# Patient Record
Sex: MALE | Race: WHITE | NOT HISPANIC OR LATINO | URBAN - METROPOLITAN AREA
[De-identification: names, ages, dates, MRNs, and addresses within clinical notes are randomized per-mention and may not be internally consistent; named-entity substitution may affect disease eponyms.]

---

## 2019-03-17 ENCOUNTER — INPATIENT (INPATIENT)
Facility: HOSPITAL | Age: 69
LOS: 2 days | Discharge: HOME | DRG: 229 | End: 2019-03-20
Attending: STUDENT IN AN ORGANIZED HEALTH CARE EDUCATION/TRAINING PROGRAM | Admitting: INTERNAL MEDICINE
Payer: MEDICARE

## 2019-03-17 VITALS
OXYGEN SATURATION: 98 % | TEMPERATURE: 98 F | RESPIRATION RATE: 20 BRPM | HEART RATE: 78 BPM | HEIGHT: 66 IN | DIASTOLIC BLOOD PRESSURE: 90 MMHG | WEIGHT: 184.09 LBS | SYSTOLIC BLOOD PRESSURE: 161 MMHG

## 2019-03-17 DIAGNOSIS — T82.7XXA INFECTION AND INFLAMMATORY REACTION DUE TO OTHER CARDIAC AND VASCULAR DEVICES, IMPLANTS AND GRAFTS, INITIAL ENCOUNTER: ICD-10-CM

## 2019-03-17 DIAGNOSIS — I49.9 CARDIAC ARRHYTHMIA, UNSPECIFIED: ICD-10-CM

## 2019-03-17 DIAGNOSIS — E11.9 TYPE 2 DIABETES MELLITUS WITHOUT COMPLICATIONS: ICD-10-CM

## 2019-03-17 DIAGNOSIS — Z95.0 PRESENCE OF CARDIAC PACEMAKER: Chronic | ICD-10-CM

## 2019-03-17 DIAGNOSIS — I10 ESSENTIAL (PRIMARY) HYPERTENSION: ICD-10-CM

## 2019-03-17 LAB
ALBUMIN SERPL ELPH-MCNC: 3.6 G/DL — SIGNIFICANT CHANGE UP (ref 3.3–5)
ALP SERPL-CCNC: 51 U/L — SIGNIFICANT CHANGE UP (ref 40–120)
ALT FLD-CCNC: 23 U/L — SIGNIFICANT CHANGE UP (ref 10–45)
ANION GAP SERPL CALC-SCNC: 18 MMOL/L — HIGH (ref 5–17)
APTT BLD: 27.3 SEC — LOW (ref 27.5–36.3)
AST SERPL-CCNC: 19 U/L — SIGNIFICANT CHANGE UP (ref 10–40)
BASOPHILS # BLD AUTO: 0 K/UL — SIGNIFICANT CHANGE UP (ref 0–0.2)
BASOPHILS NFR BLD AUTO: 0.5 % — SIGNIFICANT CHANGE UP (ref 0–2)
BILIRUB SERPL-MCNC: 0.5 MG/DL — SIGNIFICANT CHANGE UP (ref 0.2–1.2)
BLD GP AB SCN SERPL QL: NEGATIVE — SIGNIFICANT CHANGE UP
BUN SERPL-MCNC: 18 MG/DL — SIGNIFICANT CHANGE UP (ref 7–23)
CALCIUM SERPL-MCNC: 9.9 MG/DL — SIGNIFICANT CHANGE UP (ref 8.4–10.5)
CHLORIDE SERPL-SCNC: 102 MMOL/L — SIGNIFICANT CHANGE UP (ref 96–108)
CO2 SERPL-SCNC: 20 MMOL/L — LOW (ref 22–31)
CREAT SERPL-MCNC: 1.53 MG/DL — HIGH (ref 0.5–1.3)
EOSINOPHIL # BLD AUTO: 0.2 K/UL — SIGNIFICANT CHANGE UP (ref 0–0.5)
EOSINOPHIL NFR BLD AUTO: 3.4 % — SIGNIFICANT CHANGE UP (ref 0–6)
GLUCOSE SERPL-MCNC: 274 MG/DL — HIGH (ref 70–99)
HBA1C BLD-MCNC: 8.3 % — HIGH (ref 4–5.6)
HCT VFR BLD CALC: 38.9 % — LOW (ref 39–50)
HCV AB S/CO SERPL IA: 0.15 S/CO — SIGNIFICANT CHANGE UP (ref 0–0.79)
HCV AB SERPL-IMP: SIGNIFICANT CHANGE UP
HGB BLD-MCNC: 13.1 G/DL — SIGNIFICANT CHANGE UP (ref 13–17)
INR BLD: 1.15 RATIO — SIGNIFICANT CHANGE UP (ref 0.88–1.16)
LYMPHOCYTES # BLD AUTO: 0.7 K/UL — LOW (ref 1–3.3)
LYMPHOCYTES # BLD AUTO: 11.7 % — LOW (ref 13–44)
MAGNESIUM SERPL-MCNC: 1.9 MG/DL — SIGNIFICANT CHANGE UP (ref 1.6–2.6)
MCHC RBC-ENTMCNC: 29.1 PG — SIGNIFICANT CHANGE UP (ref 27–34)
MCHC RBC-ENTMCNC: 33.7 GM/DL — SIGNIFICANT CHANGE UP (ref 32–36)
MCV RBC AUTO: 86.2 FL — SIGNIFICANT CHANGE UP (ref 80–100)
MONOCYTES # BLD AUTO: 0.9 K/UL — SIGNIFICANT CHANGE UP (ref 0–0.9)
MONOCYTES NFR BLD AUTO: 15.8 % — HIGH (ref 2–14)
NEUTROPHILS # BLD AUTO: 4 K/UL — SIGNIFICANT CHANGE UP (ref 1.8–7.4)
NEUTROPHILS NFR BLD AUTO: 68.6 % — SIGNIFICANT CHANGE UP (ref 43–77)
PHOSPHATE SERPL-MCNC: 3.3 MG/DL — SIGNIFICANT CHANGE UP (ref 2.5–4.5)
PLATELET # BLD AUTO: 169 K/UL — SIGNIFICANT CHANGE UP (ref 150–400)
POTASSIUM SERPL-MCNC: 4.7 MMOL/L — SIGNIFICANT CHANGE UP (ref 3.5–5.3)
POTASSIUM SERPL-SCNC: 4.7 MMOL/L — SIGNIFICANT CHANGE UP (ref 3.5–5.3)
PROT SERPL-MCNC: 7.6 G/DL — SIGNIFICANT CHANGE UP (ref 6–8.3)
PROTHROM AB SERPL-ACNC: 13.2 SEC — HIGH (ref 10–12.9)
RBC # BLD: 4.52 M/UL — SIGNIFICANT CHANGE UP (ref 4.2–5.8)
RBC # FLD: 14.6 % — HIGH (ref 10.3–14.5)
RH IG SCN BLD-IMP: NEGATIVE — SIGNIFICANT CHANGE UP
SODIUM SERPL-SCNC: 140 MMOL/L — SIGNIFICANT CHANGE UP (ref 135–145)
WBC # BLD: 5.8 K/UL — SIGNIFICANT CHANGE UP (ref 3.8–10.5)
WBC # FLD AUTO: 5.8 K/UL — SIGNIFICANT CHANGE UP (ref 3.8–10.5)

## 2019-03-17 PROCEDURE — 93010 ELECTROCARDIOGRAM REPORT: CPT

## 2019-03-17 PROCEDURE — 99223 1ST HOSP IP/OBS HIGH 75: CPT | Mod: GC

## 2019-03-17 PROCEDURE — 71045 X-RAY EXAM CHEST 1 VIEW: CPT | Mod: 26

## 2019-03-17 RX ORDER — PIOGLITAZONE HYDROCHLORIDE 15 MG/1
1 TABLET ORAL
Qty: 0 | Refills: 0 | COMMUNITY

## 2019-03-17 RX ORDER — EZETIMIBE AND SIMVASTATIN 10; 80 MG/1; MG/1
1 TABLET, FILM COATED ORAL
Qty: 0 | Refills: 0 | COMMUNITY

## 2019-03-17 RX ORDER — CLOPIDOGREL BISULFATE 75 MG/1
75 TABLET, FILM COATED ORAL DAILY
Qty: 0 | Refills: 0 | Status: DISCONTINUED | OUTPATIENT
Start: 2019-03-17 | End: 2019-03-20

## 2019-03-17 RX ORDER — ASPIRIN/CALCIUM CARB/MAGNESIUM 324 MG
81 TABLET ORAL DAILY
Qty: 0 | Refills: 0 | Status: DISCONTINUED | OUTPATIENT
Start: 2019-03-17 | End: 2019-03-20

## 2019-03-17 RX ORDER — OMEGA-3 ACID ETHYL ESTERS 1 G
1 CAPSULE ORAL
Qty: 0 | Refills: 0 | COMMUNITY

## 2019-03-17 RX ORDER — SIMVASTATIN 20 MG/1
10 TABLET, FILM COATED ORAL AT BEDTIME
Qty: 0 | Refills: 0 | Status: DISCONTINUED | OUTPATIENT
Start: 2019-03-17 | End: 2019-03-20

## 2019-03-17 RX ORDER — HYDRALAZINE HCL 50 MG
10 TABLET ORAL EVERY 8 HOURS
Qty: 0 | Refills: 0 | Status: DISCONTINUED | OUTPATIENT
Start: 2019-03-17 | End: 2019-03-20

## 2019-03-17 RX ORDER — CLOPIDOGREL BISULFATE 75 MG/1
1 TABLET, FILM COATED ORAL
Qty: 0 | Refills: 0 | COMMUNITY

## 2019-03-17 RX ORDER — SITAGLIPTIN AND METFORMIN HYDROCHLORIDE 500; 50 MG/1; MG/1
1 TABLET, FILM COATED ORAL
Qty: 0 | Refills: 0 | COMMUNITY

## 2019-03-17 RX ORDER — ASPIRIN/CALCIUM CARB/MAGNESIUM 324 MG
1 TABLET ORAL
Qty: 0 | Refills: 0 | COMMUNITY

## 2019-03-17 RX ORDER — DAPTOMYCIN 500 MG/10ML
500 INJECTION, POWDER, LYOPHILIZED, FOR SOLUTION INTRAVENOUS EVERY 24 HOURS
Qty: 0 | Refills: 0 | Status: DISCONTINUED | OUTPATIENT
Start: 2019-03-18 | End: 2019-03-20

## 2019-03-17 RX ORDER — INSULIN LISPRO 100/ML
VIAL (ML) SUBCUTANEOUS
Qty: 0 | Refills: 0 | Status: DISCONTINUED | OUTPATIENT
Start: 2019-03-17 | End: 2019-03-20

## 2019-03-17 RX ORDER — OMEPRAZOLE 10 MG/1
1 CAPSULE, DELAYED RELEASE ORAL
Qty: 0 | Refills: 0 | COMMUNITY

## 2019-03-17 RX ORDER — INSULIN GLARGINE 100 [IU]/ML
5 INJECTION, SOLUTION SUBCUTANEOUS AT BEDTIME
Qty: 0 | Refills: 0 | Status: DISCONTINUED | OUTPATIENT
Start: 2019-03-17 | End: 2019-03-20

## 2019-03-17 RX ORDER — AMLODIPINE BESYLATE 2.5 MG/1
1 TABLET ORAL
Qty: 0 | Refills: 0 | COMMUNITY

## 2019-03-17 RX ORDER — PREGABALIN 225 MG/1
1 CAPSULE ORAL
Qty: 0 | Refills: 0 | COMMUNITY

## 2019-03-17 RX ORDER — INSULIN LISPRO 100/ML
VIAL (ML) SUBCUTANEOUS AT BEDTIME
Qty: 0 | Refills: 0 | Status: DISCONTINUED | OUTPATIENT
Start: 2019-03-17 | End: 2019-03-20

## 2019-03-17 RX ORDER — EMPAGLIFLOZIN 10 MG/1
1 TABLET, FILM COATED ORAL
Qty: 0 | Refills: 0 | COMMUNITY

## 2019-03-17 RX ADMIN — SIMVASTATIN 10 MILLIGRAM(S): 20 TABLET, FILM COATED ORAL at 22:54

## 2019-03-17 RX ADMIN — Medication 4: at 16:28

## 2019-03-17 RX ADMIN — INSULIN GLARGINE 5 UNIT(S): 100 INJECTION, SOLUTION SUBCUTANEOUS at 21:18

## 2019-03-17 RX ADMIN — Medication 10 MILLIGRAM(S): at 22:54

## 2019-03-17 RX ADMIN — Medication 10 MILLIGRAM(S): at 16:05

## 2019-03-17 NOTE — CHART NOTE - NSCHARTNOTEFT_GEN_A_CORE
====================  CCU MIDNIGHT ROUNDS  ====================    GUSTAVO LOVELL  [id number]    ====================  SUMMARY: HPI:  This is a 68 year old man with past medical history of Sick Sinus Syndrome with PPM (Medtronic), T2DM, HTN, HLD, PVD with Bilateral LE Stents and GERD transferred from [facility] for lead extraction 2/2 infected Pacemaker.  Patient states he had a battery change last year and his PPM migrated out through incision site.  Chart discussed erosion of generator and Coag-negative Staph infection of PPM pocket site and negative blood cultures, received Vanc and Zosyn from 3/7-3/12 and started on Daptomycin 500mg IV daily since 3/13.  Pacemaker extracted and leads attached to PPM near RIJ dressed sterilely.  During course of hospitalization patient developed FRANCIS slowly resolving, creatinine is 1.53 at [facility] (high 2.2 at [facility]).  Patient denies fever, chills, syncope, recent sick contact, SOB, chest pain, abdominal pain, N/V/D. (17 Mar 2019 15:50)    ====================        ====================  NEW EVENTS:  ====================        ====================  VITALS (Last 12 hrs):  ====================    T(C): 37.3 (03-17-19 @ 19:00), Max: 37.3 (03-17-19 @ 19:00)  HR: 80 (03-17-19 @ 20:00) (78 - 80)  BP: 113/87 (03-17-19 @ 20:00) (113/87 - 183/76)  BP(mean): 97 (03-17-19 @ 20:00) (84 - 116)  RR: 21 (03-17-19 @ 20:00) (19 - 24)  SpO2: 98% (03-17-19 @ 20:00) (96% - 99%)      TELEMETRY:      *BLOOD GAS/ARTERIAL/MIXED/VENOUS  *LACTATE    I&O's Summary    17 Mar 2019 07:01  -  17 Mar 2019 20:36  --------------------------------------------------------  IN: 240 mL / OUT: 0 mL / NET: 240 mL        ====================  PLAN:  ====================      Jose Liao NIK MARKS  #[id number] ====================  CCU MIDNIGHT ROUNDS  ====================    GUSTAVO LOVELL  [id number]    ====================  SUMMARY: HPI:  This is a 68 year old man with past medical history of Sick Sinus Syndrome with PPM (Medtronic), T2DM, HTN, HLD, PVD with Bilateral LE Stents and GERD transferred from [facility] for lead extraction 2/2 infected Pacemaker.  Patient states he had a battery change last year and his PPM migrated out through incision site.  Chart discussed erosion of generator and Coag-negative Staph infection of PPM pocket site and negative blood cultures, received Vanc and Zosyn from 3/7-3/12 and started on Daptomycin 500mg IV daily since 3/13.  Pacemaker extracted and leads attached to PPM near RIJ dressed sterilely.  During course of hospitalization patient developed FRANCIS slowly resolving, creatinine is 1.53 at [facility] (high 2.2 at [facility]).  Patient denies fever, chills, syncope, recent sick contact, SOB, chest pain, abdominal pain, N/V/D. (17 Mar 2019 15:50)    ====================        ====================  NEW EVENTS:  ====================  Pt c/o chills, discomfort. Afebrile, VSS. Will f/u CBC and monitor for fevers.      ====================  VITALS (Last 12 hrs):  ====================    T(C): 37.3 (03-17-19 @ 19:00), Max: 37.3 (03-17-19 @ 19:00)  HR: 80 (03-17-19 @ 20:00) (78 - 80)  BP: 113/87 (03-17-19 @ 20:00) (113/87 - 183/76)  BP(mean): 97 (03-17-19 @ 20:00) (84 - 116)  RR: 21 (03-17-19 @ 20:00) (19 - 24)  SpO2: 98% (03-17-19 @ 20:00) (96% - 99%)      TELEMETRY: Paced rhythm      I&O's Summary    17 Mar 2019 07:01  -  17 Mar 2019 20:36  --------------------------------------------------------  IN: 240 mL / OUT: 0 mL / NET: 240 mL        ====================  PLAN:  ====================    #Infected PPM  - NPO after midnight for possible lead extraction  - Cont daptomycin for PPM infection, check CPK weekly while on dapto. F/u BCx. ID recs appreciated.  - FRANCIS from vanc at OSH, f/u BMP, avoid nephrotoxic agents  - TTE tomorrow  - Cont DAPT, statin for b/l LE stents  - Cont hydralazine    Jose Liao, CCU PA-C  #[id number]

## 2019-03-17 NOTE — CONSULT NOTE ADULT - ASSESSMENT
68 year old man with past medical history of Sick Sinus Syndrome with PPM (Medtronic), T2DM, HTN, HLD, PVD with Bilateral LE Stents and GERD transferred from [facility] for lead extraction 2/2 infected Pacemaker.  Patient states he had a battery change last year and his PPM migrated out through incision site. Records from [place] reviewed.   Only cx positive are superficial wound cx : Coag-negative Staph   Blood cx : negative blood cultures    Antibiotic history:  Vancomycin: 3/7 through 3/12  Zosyn : 3/7 through 3/12 then   Received Daptomycin 500mg IV daily since 3/13.  (Vancomcyin switched to Daptomycin for elevated creatinine)    So far has received 14 days of therapy for CNS in superficial cx.     Suggest:    *Infected pacemaker:    c/w Daptomycin 500mg IV daily     * r/o endocarditis     all blood cx from [place] are negative     check TTE     Repeat blood cx 68 year old man with past medical history of Sick Sinus Syndrome with PPM (Medtronic), T2DM, HTN, HLD, PVD with Bilateral LE Stents and GERD transferred from [facility] for lead extraction 2/2 infected Pacemaker.  Patient states he had a battery change last year and his PPM migrated out through incision site. Records from [place] reviewed.   Only cx positive are superficial wound cx : Coag-negative Staph   Blood cx : negative blood cultures    Antibiotic history:  Vancomycin: 3/7 through 3/12  Zosyn : 3/7 through 3/12 then   Received Daptomycin 500mg IV daily since 3/13.  (Vancomcyin switched to Daptomycin for elevated creatinine)  So far has received 14 days of therapy for CNS in superficial cx.     Pacemaker infection   r/o endocarditis     Suggest:    *Infected pacemaker:    c/w Daptomycin 500mg IV daily     * r/o endocarditis     all blood cx from [place] are negative     check TTE     Repeat blood cx

## 2019-03-17 NOTE — H&P ADULT - NSHPREVIEWOFSYSTEMS_GEN_ALL_CORE
REVIEW OF SYSTEMS:    CONSTITUTIONAL: No weakness, fevers or chills  EYES/ENT: No visual changes;  No vertigo or throat pain   NECK: No pain or stiffness  RESPIRATORY: No cough, wheezing, hemoptysis; No shortness of breath  CARDIOVASCULAR: No chest pain or palpitations, dehisence of ppm incision with migration of ppm  GASTROINTESTINAL: No abdominal or epigastric pain. No nausea, vomiting, or hematemesis; No diarrhea or constipation. No melena or hematochezia.  GENITOURINARY: No dysuria, frequency or hematuria  NEUROLOGICAL: No numbness or weakness  SKIN: No itching, burning, rashes, or lesions   All other review of systems is negative unless indicated above.

## 2019-03-17 NOTE — H&P ADULT - NSICDXPROBLEM_GEN_ALL_CORE_FT
PROBLEM DIAGNOSES  Problem: Hypertension  Assessment and Plan: Hydralazine 10mg TID    Problem: Type 2 diabetes mellitus  Assessment and Plan: Lantus 5units for expected NPO   HISS  Consistent Carb DASH Diet    Problem: Infected pacemaker  Assessment and Plan: ECG, Chest Xray TTE to r/o Endocarditis  ID consult re Daptomycin continuation  NPO p MN for possible Lead extraction

## 2019-03-17 NOTE — H&P ADULT - NSICDXPASTMEDICALHX_GEN_ALL_CORE_FT
PAST MEDICAL HISTORY:  Chronic GERD     History of sick sinus syndrome     Hyperlipidemia     Hypertension     Peripheral vascular disease     Type 2 diabetes mellitus

## 2019-03-17 NOTE — CONSULT NOTE ADULT - SUBJECTIVE AND OBJECTIVE BOX
Reason for Consultation: Lead Extraction  Chief Complaint: Transfer from [facility] for Lead Extraction  Date of Admission: 3/17/19    HPI:  68 year old male with PMHx HTN, HLD     Underwent attmpted system extraction with Dr. Dhaliwal. Right IJ active fixation temporary pacemaker with externalized Medtronic generator was placed. At the time of attempted extraction, the generated was removed, but the remaining pocket leads were cute and retracted to the central circulation Currently, he remains with 3 intravascular pacing leads. Transferred to [facility] for complete lead extraction of the remaining leads before reimplanation     Past Medical History:       Past Surgical History:       Medications:       Allergies:  Allergy Status Unknown      FAMILY HISTORY:      Social History:  Tobacco Use: denies  Alcohol Use: denies  Drug Use: denies    Review of Systems:  REVIEW OF SYSTEMS:  CONSTITUTIONAL: No weakness, fevers or chills  EYES/ENT: No visual changes;  No dysphagia  NECK: No pain or stiffness  RESPIRATORY: No cough, wheezing, hemoptysis; No shortness of breath  CARDIOVASCULAR: No chest pain or palpitations; No lower extremity edema  GASTROINTESTINAL: No abdominal or epigastric pain. No nausea, vomiting, or hematemesis; No diarrhea or constipation. No melena or hematochezia.  BACK: No back pain  GENITOURINARY: No dysuria, frequency or hematuria  NEUROLOGICAL: No numbness or weakness  SKIN: No itching, burning, rashes, or lesions   All other review of systems is negative unless indicated above.    Vitals:  T(F): 98 (03-17), Max: 98 (03-17)  HR: 78 (03-17) (78 - 78)  BP: 153/73 (03-17) (150/74 - 176/73)  RR: 24 (03-17)  SpO2: 97% (03-17)    Physical Exam:  Appearance: no acute distress  HEENT: PERRL  Neck: no JVD  Cardiovascular: RRR, normal S1 S2, no murmurs, rubs, or gallops  Respiratory: clear to auscultation bilaterally  Gastrointestinal: soft, non-tender, non-distended  Neurologic: non-focal  Psychiatry: AAOx3, mood & affect appropriate  Extremities: no LE edema  Vascular: peripheral pulses palpable 2+ bilaterally  Skin: no rashes, ecchymoses, or cyanosis  Lymphatic: no lymphadenopathy    Labs: Personally reviewed                          CARDIOVASCULAR DIAGNOSTIC TESTING:  Telemetry:  ECG: Personally Reviewed    [] Echocardiogram:  [] Stress Test:  [] Catheterization:    RADIOLOGY: Reason for Consultation: Lead Extraction  Chief Complaint: Transfer from [facility] for Lead Extraction  Date of Admission: 3/17/19    HPI:  68M with Sick Sinus Syndrome with PPM (Medtronic), HTN, HLD, DM2, PVD with Bilateral LE Stents and GERD transferred from [facility] for lead extraction 2/2 infected pacemaker.  Patient states he had a battery change last year and his PPM migrated out through incision site. Underwent attempted system extraction with Dr. Dhaliwal. Right IJ active fixation temporary pacemaker with externalized Medtronic generator was placed. At the time of attempted extraction, the generated was removed, but the remaining pocket leads were cute and retracted to the central circulation Currently, he remains with 3 intravascular pacing leads. Transferred to [facility] for complete lead extraction of the remaining leads before reimplantation.     Per review of chart documentation, patient has coag-negative Staph infection of PPM pocket site on wound culture and negative blood cultures. Received Vanc and Zosyn from 3/7-3/12 and started on Daptomycin 500mg IV daily since 3/13. During course of hospitalization patient developed FRANCIS slowly resolving, creatinine is 1.53 at [facility] (high 2.2 at [facility]).  Patient denies fever, chills, syncope, recent sick contact, SOB, chest pain, abdominal pain, N/V/D.           Past Medical History:       Past Surgical History:       Medications:       Allergies:  Allergy Status Unknown      FAMILY HISTORY:      Social History:  Tobacco Use: denies  Alcohol Use: denies  Drug Use: denies    Review of Systems:  REVIEW OF SYSTEMS:  CONSTITUTIONAL: No weakness, fevers or chills  EYES/ENT: No visual changes;  No dysphagia  NECK: No pain or stiffness  RESPIRATORY: No cough, wheezing, hemoptysis; No shortness of breath  CARDIOVASCULAR: No chest pain or palpitations; No lower extremity edema  GASTROINTESTINAL: No abdominal or epigastric pain. No nausea, vomiting, or hematemesis; No diarrhea or constipation. No melena or hematochezia.  BACK: No back pain  GENITOURINARY: No dysuria, frequency or hematuria  NEUROLOGICAL: No numbness or weakness  SKIN: No itching, burning, rashes, or lesions   All other review of systems is negative unless indicated above.    Vitals:  T(F): 98 (03-17), Max: 98 (03-17)  HR: 78 (03-17) (78 - 78)  BP: 153/73 (03-17) (150/74 - 176/73)  RR: 24 (03-17)  SpO2: 97% (03-17)    Physical Exam:  Appearance: no acute distress  HEENT: PERRL  Neck: no JVD  Cardiovascular: RRR, normal S1 S2, no murmurs, rubs, or gallops  Respiratory: clear to auscultation bilaterally  Gastrointestinal: soft, non-tender, non-distended  Neurologic: non-focal  Psychiatry: AAOx3, mood & affect appropriate  Extremities: no LE edema  Vascular: peripheral pulses palpable 2+ bilaterally  Skin: no rashes, ecchymoses, or cyanosis  Lymphatic: no lymphadenopathy    Labs: Personally reviewed                          CARDIOVASCULAR DIAGNOSTIC TESTING:  Telemetry:  ECG: Personally Reviewed    [] Echocardiogram:  [] Stress Test:  [] Catheterization:    RADIOLOGY: Reason for Consultation: Lead Extraction  Chief Complaint: Transfer from [facility] for Lead Extraction  Date of Admission: 3/17/19    HPI:  68M with Sick Sinus Syndrome with PPM (Medtronic), HTN, HLD, DM2, PVD with Bilateral LE Stents and GERD transferred from [facility] for lead extraction 2/2 infected pacemaker.  Patient states he had a battery change last year and his PPM migrated out through incision site. Underwent attempted system extraction with Dr. Dhaliwal. Right IJ active fixation temporary pacemaker with externalized Medtronic generator was placed. At the time of attempted extraction, the generator was removed, but the remaining pocket leads were cut and retracted to the central circulation Currently, he remains with 3 intravascular pacing leads. Transferred to [facility] for complete lead extraction of the remaining leads before reimplantation. Per review of chart documentation, patient has coag-negative Staph infection of PPM pocket site on wound culture and negative blood cultures. Received Vanc and Zosyn from 3/7-3/12 and started on Daptomycin 500mg IV daily since 3/13. During course of hospitalization patient developed FRANCIS slowly resolving, creatinine is 1.53 at [facility] (high 2.2 at [facility]).  Patient denies fever, chills, syncope, recent sick contact, SOB, chest pain, abdominal pain, N/V/D.     Past Medical and Surgical History:   Peripheral vascular disease  Hyperlipidemia  Hypertension  Chronic GERD  History of sick sinus syndrome  Type 2 diabetes mellitus  Presence of permanent cardiac pacemaker    Medications:     Allergies:  Allergy Status Unknown    FAMILY HISTORY:  No pertinent hx in 1st degree relatives    Social History:  Tobacco Use: denies  Alcohol Use: denies  Drug Use: denies    Review of Systems:  CONSTITUTIONAL: No weakness, fevers or chills  EYES/ENT: No visual changes;  No dysphagia  NECK: No pain or stiffness  RESPIRATORY: No cough, wheezing, hemoptysis; No shortness of breath  CARDIOVASCULAR: No chest pain or palpitations; No lower extremity edema  GASTROINTESTINAL: No abdominal or epigastric pain. No nausea, vomiting, or hematemesis; No diarrhea or constipation. No melena or hematochezia.  BACK: No back pain  GENITOURINARY: No dysuria, frequency or hematuria  NEUROLOGICAL: No numbness or weakness  SKIN: No itching, burning, rashes, or lesions   All other review of systems is negative unless indicated above.    Vitals:  T(F): 98 (03-17), Max: 98 (03-17)  HR: 78 (03-17) (78 - 78)  BP: 153/73 (03-17) (150/74 - 176/73)  RR: 24 (03-17)  SpO2: 97% (03-17)    Physical Exam:  Appearance: no acute distress  HEENT: PERRL  Neck: no JVD, right neck dressing c/d/i  Cardiovascular: RRR, normal S1 S2, no murmurs, rubs, or gallops  Respiratory: clear to auscultation bilaterally  Gastrointestinal: soft, non-tender, non-distended  Neurologic: non-focal  Psychiatry: AAOx3, mood & affect appropriate  Extremities: no LE edema  Vascular: peripheral pulses palpable 2+ bilaterally  Skin: left chest wall wound, dressing c/d/i    Labs: Personally reviewed    CARDIOVASCULAR DIAGNOSTIC TESTING:  Telemetry:  ECG: Personally Reviewed    [] Echocardiogram:  [] Stress Test:  [] Catheterization:    RADIOLOGY: Reason for Consultation: Lead Extraction  Chief Complaint: Transfer from [facility] for Lead Extraction  Date of Admission: 3/17/19    HPI:  68M with CHB vs. SSS s/p PPM (Medtronic), HTN, HLD, DM2, PVD with Bilateral LE Stents and GERD transferred from [facility] for lead extraction 2/2 infected pacemaker.  Patient states he had a battery change last year and his PPM migrated out through incision site. Underwent attempted system extraction with Dr. Dhaliwal. Right IJ active fixation temporary pacemaker with externalized Medtronic generator was placed. At the time of attempted extraction, the generator was removed, but the remaining pocket leads were cut and retracted to the central circulation Currently, he remains with 3 intravascular pacing leads. Transferred to [facility] for complete lead extraction of the remaining leads before reimplantation. Per review of chart documentation, patient has coag-negative Staph infection of PPM pocket site on wound culture and negative blood cultures. Received Vanc and Zosyn from 3/7-3/12 and started on Daptomycin 500mg IV daily since 3/13. During course of hospitalization patient developed FRANCIS slowly resolving, creatinine is 1.53 at [facility] (high 2.2 at [facility]).  Patient denies fever, chills, syncope, recent sick contact, SOB, chest pain, abdominal pain, N/V/D.     Past Medical and Surgical History:   Peripheral vascular disease  Hyperlipidemia  Hypertension  Chronic GERD  History of sick sinus syndrome  Type 2 diabetes mellitus  Presence of permanent cardiac pacemaker    Medications:     Allergies:  Allergy Status Unknown    FAMILY HISTORY:  No pertinent hx in 1st degree relatives    Social History:  Tobacco Use: denies  Alcohol Use: denies  Drug Use: denies    Review of Systems:  CONSTITUTIONAL: No weakness, fevers or chills  EYES/ENT: No visual changes;  No dysphagia  NECK: No pain or stiffness  RESPIRATORY: No cough, wheezing, hemoptysis; No shortness of breath  CARDIOVASCULAR: No chest pain or palpitations; No lower extremity edema  GASTROINTESTINAL: No abdominal or epigastric pain. No nausea, vomiting, or hematemesis; No diarrhea or constipation. No melena or hematochezia.  BACK: No back pain  GENITOURINARY: No dysuria, frequency or hematuria  NEUROLOGICAL: No numbness or weakness  SKIN: No itching, burning, rashes, or lesions   All other review of systems is negative unless indicated above.    Vitals:  T(F): 98 (03-17), Max: 98 (03-17)  HR: 78 (03-17) (78 - 78)  BP: 153/73 (03-17) (150/74 - 176/73)  RR: 24 (03-17)  SpO2: 97% (03-17)    Physical Exam:  Appearance: no acute distress  HEENT: PERRL  Neck: no JVD, right neck dressing c/d/i  Cardiovascular: RRR, normal S1 S2, no murmurs, rubs, or gallops  Respiratory: clear to auscultation bilaterally  Gastrointestinal: soft, non-tender, non-distended  Neurologic: non-focal  Psychiatry: AAOx3, mood & affect appropriate  Extremities: no LE edema  Vascular: peripheral pulses palpable 2+ bilaterally  Skin: left chest wall wound, dressing c/d/i    Labs: Personally reviewed    CARDIOVASCULAR DIAGNOSTIC TESTING:  Telemetry:  ECG: Personally Reviewed    [] Echocardiogram:  [] Stress Test:  [] Catheterization:    RADIOLOGY:

## 2019-03-17 NOTE — CONSULT NOTE ADULT - ASSESSMENT
68M with Sick Sinus Syndrome with PPM (Medtronic), HTN, HLD, DM2, PVD with Bilateral LE Stents and GERD transferred from [facility] for lead extraction 2/2 infected pacemaker.    Recommendations:  -obtain CXR  -obtain Echo to assess EF and rule out endocarditis  -hold DVT Px in PM  -NPO for lead extraction in AM    Shaq Loomis M.D.  Cardiology Fellow 68M with Sick Sinus Syndrome with PPM (Medtronic), HTN, HLD, DM2, PVD with Bilateral LE Stents and GERD transferred from [facility] for lead extraction 2/2 infected pacemaker. Underwent attempted system extraction with Dr. Dhaliwal. Right IJ active fixation temporary pacemaker with externalized Medtronic generator was placed. At the time of attempted extraction, the generator was removed, but the remaining pocket leads were cut and retracted to the central circulation. Remains with 3 intravascular pacing leads. Currently, VVI paced at HR 80.    Recommendations:  -obtain CXR  -obtain Echo to assess EF and rule out endocarditis  -hold DVT Px in PM  -NPO for lead extraction in AM    Shaq Loomis M.D.  Cardiology Fellow 68M with CHB vs. SSS s/p PPM (Medtronic), HTN, HLD, DM2, PVD with Bilateral LE Stents and GERD transferred from [facility] for lead extraction 2/2 infected pacemaker. Underwent attempted system extraction with Dr. Dhaliwal. Right IJ active fixation temporary pacemaker with externalized Medtronic generator was placed. At the time of attempted extraction, the generator was removed, but the remaining pocket leads were cut and retracted to the central circulation. Remains with 3 intravascular pacing leads. Currently, VVI paced at HR 80.    Recommendations:  -obtain CXR  -obtain Echo to assess EF and rule out endocarditis  -hold DVT Px in PM  -NPO for lead extraction in AM    Shaq Loomis M.D.  Cardiology Fellow

## 2019-03-17 NOTE — H&P ADULT - NSHPPHYSICALEXAM_GEN_ALL_CORE
PHYSICAL EXAM:      Constitutional: No acute disress    Eyes: DWIGHT, EOMI    ENMT: Nml oral mucosa    Respiratory: CTA bilaterally    Cardiovascular: S1S2 RRR PPM Extraction with leads attched externally to temp PPM    Gastrointestinal: +bs    Extremities: No pedal edema    Vascular: +2 pedal pulses    Neurological: A+OX3

## 2019-03-17 NOTE — CONSULT NOTE ADULT - SUBJECTIVE AND OBJECTIVE BOX
Patient is a 68y old  Male who presents with a chief complaint of Lead Extraction 2/2 Infected PPM (17 Mar 2019 15:50)    HPI:  This is a 68 year old man with past medical history of Sick Sinus Syndrome with PPM (Medtronic), T2DM, HTN, HLD, PVD with Bilateral LE Stents and GERD transferred from [facility] for lead extraction 2/2 infected Pacemaker.  Patient states he had a battery change last year and his PPM migrated out through incision site.  Chart discussed erosion of generator and Coag-negative Staph infection of PPM pocket site and negative blood cultures, received Vanc and Zosyn from 3/7-3/12 and started on Daptomycin 500mg IV daily since 3/13.  Pacemaker extracted and leads attached to PPM near RIJ dressed sterilely.  During course of hospitalization patient developed FRANCIS slowly resolving, creatinine is 1.53 at [facility] (high 2.2 at [facility]).  Patient denies fever, chills, syncope, recent sick contact, SOB, chest pain, abdominal pain, N/V/D. (17 Mar 2019 15:50)    Above reviewed. Patient denied pain at pacemaker site, fever, chills. ROS negative.     prior hospital charts reviewed [ x ]  primary team notes reviewed [ x ]  other consultant notes reviewed [ x ]    PAST MEDICAL & SURGICAL HISTORY:  Peripheral vascular disease  Hyperlipidemia  Hypertension  Chronic GERD  History of sick sinus syndrome  Type 2 diabetes mellitus  Presence of permanent cardiac pacemaker    Allergies  contrast media (iodine-based) (Short breath)    ANTIMICROBIALS (past 90 days)  MEDICATIONS  (STANDING):    ANTIMICROBIALS:      OTHER MEDS: MEDICATIONS  (STANDING):  aspirin enteric coated 81 daily  clopidogrel Tablet 75 daily  hydrALAZINE 10 every 8 hours  insulin glargine Injectable (LANTUS) 5 at bedtime  insulin lispro (HumaLOG) corrective regimen sliding scale  three times a day before meals  insulin lispro (HumaLOG) corrective regimen sliding scale  at bedtime  simvastatin 10 at bedtime    SOCIAL HISTORY:   hx smoking  non-smoker    FAMILY HISTORY:  Mother: , age 54 stroke   Father,  of old age     REVIEW OF SYSTEMS  [  ] ROS unobtainable because:    [ x ] All other systems negative except as noted below: pacemaker site wound dehiscence     Constitutional:  [ ] fever [ ] chills  [ ] weight loss  [ ] weakness  Skin:  [ ] rash [ ] phlebitis	  Eyes: [ ] icterus [ ] pain  [ ] discharge	  ENMT: [ ] sore throat  [ ] thrush [ ] ulcers [ ] exudates  Respiratory: [ ] dyspnea [ ] hemoptysis [ ] cough [ ] sputum	  Cardiovascular:  [ ] chest pain [ ] palpitations [ ] edema	  Gastrointestinal:  [ ] nausea [ ] vomiting [ ] diarrhea [ ] constipation [ ] pain	  Genitourinary:  [ ] dysuria [ ] frequency [ ] hematuria [ ] discharge [ ] flank pain  [ ] incontinence  Musculoskeletal:  [ ] myalgias [ ] arthralgias [ ] arthritis  [ ] back pain  Neurological:  [ ] headache [ ] seizures  [ ] confusion/altered mental status  Psychiatric:  [ ] anxiety [ ] depression	  Hematology/Lymphatics:  [ ] lymphadenopathy  Endocrine:  [ ] adrenal [ ] thyroid  Allergic/Immunologic:	 [ ] transplant [ ] seasonal    Vital Signs Last 24 Hrs  T(F): 98 (19 @ 14:13), Max: 98 (19 @ 14:13)    Vital Signs Last 24 Hrs  HR: 78 (19 @ 16:00) (78 - 78)  BP: 171/71 (19 @ 16:00) (150/74 - 176/73)  RR: 22 (19 @ 16:00)  SpO2: 98% (19 @ 16:00) (97% - 98%)  Wt(kg): --    PHYSICAL EXAM:  General: non-toxic  HEAD/EYES: anicteric, PERRL  ENT:  supple  Cardiovascular:   S1, S2  chest wall : Pacemaker site dressing C/D/I   Respiratory:  clear bilaterally  GI:  soft, non-tender, normal bowel sounds  :  no CVA tenderness   Musculoskeletal:  no synovitis  Neurologic:  grossly non-focal  Skin:  no rash  Lymph: no lymphadenopathy  Psychiatric:  appropriate affect  Vascular:  no phlebitis                        13.1   5.8   )-----------( 169      ( 17 Mar 2019 14:56 )             38.9         140  |  102  |  18  ----------------------------<  274<H>  4.7   |  20<L>  |  1.53<H>    Ca    9.9      17 Mar 2019 14:56  Phos  3.3       Mg     1.9         TPro  7.6  /  Alb  3.6  /  TBili  0.5  /  DBili  x   /  AST  19  /  ALT  23  /  AlkPhos  51  03-17      MICROBIOLOGY:  From [facility]     Superficial wound cx - 3/6/2019    Blood cx: negative     RADIOLOGY:  NA

## 2019-03-17 NOTE — CONSULT NOTE ADULT - ATTENDING COMMENTS
68 year old male with Sick Sinus Syndrome requiring a pacemaker, had a pacemaker battery exchage about a year ago and noticed that the PPM was migrating through the skin. He was transferred from [facility] where the PPM was extracted, but the lead wires remained and attached to PPM near [facility]. During his hospitalization, he developed worsening renal function, and was changed from vancomycin to daptomycin. All blood cultures were negative, but a wound culture was positive for CoNS. He was continued on dapto and transferred to [facility] for lead extraction and reimplantation of another PPM. He currently feels well. Afebrile. WBC WNL. Recommend: Continue daptomycin 500 mG IV q 24 hours, check CPK weekly while on daptomycin, check blood cultures x 2 sets, f/u EP regarding explantation of lead wires. Check TTE.

## 2019-03-17 NOTE — H&P ADULT - HISTORY OF PRESENT ILLNESS
This is a 68 year old man with past medical history of Sick Sinus Syndrome with PPM (Medtronic), T2DM, HTN, HLD, PVD with Bilateral LE Stents and GERD transferred from [facility] for lead extraction 2/2 infected Pacemaker.  Patient states he had a battery change last year and his PPM migrated out through incision site.  Chart discussed erosion of generator and Coag-negative Staph infection of PPM pocket site and negative blood cultures, received Vanc and Zosyn from 3/7-3/12 and started on Daptomycin 500mg IV daily since 3/13.  Pacemaker extracted and leads attached to PPM near RIJ dressed sterilely.  During course of hospitalization patient developed FRANCIS slowly resolving, creatinine is 1.53 at [facility] (high 2.2 at [facility]).  Patient denies fever, chills, syncope, recent sick contact, SOB, chest pain, abdominal pain, N/V/D.

## 2019-03-18 DIAGNOSIS — I73.9 PERIPHERAL VASCULAR DISEASE, UNSPECIFIED: ICD-10-CM

## 2019-03-18 DIAGNOSIS — N17.9 ACUTE KIDNEY FAILURE, UNSPECIFIED: ICD-10-CM

## 2019-03-18 LAB
ANION GAP SERPL CALC-SCNC: 13 MMOL/L — SIGNIFICANT CHANGE UP (ref 5–17)
ANION GAP SERPL CALC-SCNC: 15 MMOL/L — SIGNIFICANT CHANGE UP (ref 5–17)
BUN SERPL-MCNC: 19 MG/DL — SIGNIFICANT CHANGE UP (ref 7–23)
BUN SERPL-MCNC: 22 MG/DL — SIGNIFICANT CHANGE UP (ref 7–23)
CALCIUM SERPL-MCNC: 9.1 MG/DL — SIGNIFICANT CHANGE UP (ref 8.4–10.5)
CALCIUM SERPL-MCNC: 9.4 MG/DL — SIGNIFICANT CHANGE UP (ref 8.4–10.5)
CHLORIDE SERPL-SCNC: 102 MMOL/L — SIGNIFICANT CHANGE UP (ref 96–108)
CHLORIDE SERPL-SCNC: 103 MMOL/L — SIGNIFICANT CHANGE UP (ref 96–108)
CK SERPL-CCNC: 63 U/L — SIGNIFICANT CHANGE UP (ref 30–200)
CO2 SERPL-SCNC: 21 MMOL/L — LOW (ref 22–31)
CO2 SERPL-SCNC: 23 MMOL/L — SIGNIFICANT CHANGE UP (ref 22–31)
CREAT SERPL-MCNC: 1.52 MG/DL — HIGH (ref 0.5–1.3)
CREAT SERPL-MCNC: 1.59 MG/DL — HIGH (ref 0.5–1.3)
GLUCOSE BLDC GLUCOMTR-MCNC: 207 MG/DL — HIGH (ref 70–99)
GLUCOSE BLDC GLUCOMTR-MCNC: 229 MG/DL — HIGH (ref 70–99)
GLUCOSE BLDC GLUCOMTR-MCNC: 255 MG/DL — HIGH (ref 70–99)
GLUCOSE SERPL-MCNC: 222 MG/DL — HIGH (ref 70–99)
GLUCOSE SERPL-MCNC: 294 MG/DL — HIGH (ref 70–99)
HCT VFR BLD CALC: 36 % — LOW (ref 39–50)
HCT VFR BLD CALC: 36.6 % — LOW (ref 39–50)
HGB BLD-MCNC: 11.9 G/DL — LOW (ref 13–17)
HGB BLD-MCNC: 12.1 G/DL — LOW (ref 13–17)
INR BLD: 1.17 RATIO — HIGH (ref 0.88–1.16)
MAGNESIUM SERPL-MCNC: 1.8 MG/DL — SIGNIFICANT CHANGE UP (ref 1.6–2.6)
MAGNESIUM SERPL-MCNC: 2.2 MG/DL — SIGNIFICANT CHANGE UP (ref 1.6–2.6)
MCHC RBC-ENTMCNC: 27.9 PG — SIGNIFICANT CHANGE UP (ref 27–34)
MCHC RBC-ENTMCNC: 28.8 PG — SIGNIFICANT CHANGE UP (ref 27–34)
MCHC RBC-ENTMCNC: 32.5 GM/DL — SIGNIFICANT CHANGE UP (ref 32–36)
MCHC RBC-ENTMCNC: 33.7 GM/DL — SIGNIFICANT CHANGE UP (ref 32–36)
MCV RBC AUTO: 85.6 FL — SIGNIFICANT CHANGE UP (ref 80–100)
MCV RBC AUTO: 85.6 FL — SIGNIFICANT CHANGE UP (ref 80–100)
PHOSPHATE SERPL-MCNC: 3.6 MG/DL — SIGNIFICANT CHANGE UP (ref 2.5–4.5)
PHOSPHATE SERPL-MCNC: 3.7 MG/DL — SIGNIFICANT CHANGE UP (ref 2.5–4.5)
PLATELET # BLD AUTO: 163 K/UL — SIGNIFICANT CHANGE UP (ref 150–400)
PLATELET # BLD AUTO: 165 K/UL — SIGNIFICANT CHANGE UP (ref 150–400)
POTASSIUM SERPL-MCNC: 4.2 MMOL/L — SIGNIFICANT CHANGE UP (ref 3.5–5.3)
POTASSIUM SERPL-MCNC: 4.2 MMOL/L — SIGNIFICANT CHANGE UP (ref 3.5–5.3)
POTASSIUM SERPL-SCNC: 4.2 MMOL/L — SIGNIFICANT CHANGE UP (ref 3.5–5.3)
POTASSIUM SERPL-SCNC: 4.2 MMOL/L — SIGNIFICANT CHANGE UP (ref 3.5–5.3)
PROTHROM AB SERPL-ACNC: 13.4 SEC — HIGH (ref 10–12.9)
RBC # BLD: 4.21 M/UL — SIGNIFICANT CHANGE UP (ref 4.2–5.8)
RBC # BLD: 4.27 M/UL — SIGNIFICANT CHANGE UP (ref 4.2–5.8)
RBC # FLD: 14.4 % — SIGNIFICANT CHANGE UP (ref 10.3–14.5)
RBC # FLD: 14.6 % — HIGH (ref 10.3–14.5)
SODIUM SERPL-SCNC: 138 MMOL/L — SIGNIFICANT CHANGE UP (ref 135–145)
SODIUM SERPL-SCNC: 139 MMOL/L — SIGNIFICANT CHANGE UP (ref 135–145)
WBC # BLD: 5 K/UL — SIGNIFICANT CHANGE UP (ref 3.8–10.5)
WBC # BLD: 5.6 K/UL — SIGNIFICANT CHANGE UP (ref 3.8–10.5)
WBC # FLD AUTO: 5 K/UL — SIGNIFICANT CHANGE UP (ref 3.8–10.5)
WBC # FLD AUTO: 5.6 K/UL — SIGNIFICANT CHANGE UP (ref 3.8–10.5)

## 2019-03-18 PROCEDURE — 99233 SBSQ HOSP IP/OBS HIGH 50: CPT

## 2019-03-18 PROCEDURE — 93306 TTE W/DOPPLER COMPLETE: CPT | Mod: 26

## 2019-03-18 PROCEDURE — 93010 ELECTROCARDIOGRAM REPORT: CPT

## 2019-03-18 PROCEDURE — 99223 1ST HOSP IP/OBS HIGH 75: CPT

## 2019-03-18 PROCEDURE — 99232 SBSQ HOSP IP/OBS MODERATE 35: CPT

## 2019-03-18 RX ORDER — MAGNESIUM SULFATE 500 MG/ML
2 VIAL (ML) INJECTION ONCE
Qty: 0 | Refills: 0 | Status: COMPLETED | OUTPATIENT
Start: 2019-03-18 | End: 2019-03-18

## 2019-03-18 RX ORDER — SENNA PLUS 8.6 MG/1
1 TABLET ORAL AT BEDTIME
Qty: 0 | Refills: 0 | Status: DISCONTINUED | OUTPATIENT
Start: 2019-03-18 | End: 2019-03-20

## 2019-03-18 RX ORDER — BENZOCAINE AND MENTHOL 5; 1 G/100ML; G/100ML
1 LIQUID ORAL EVERY 8 HOURS
Qty: 0 | Refills: 0 | Status: DISCONTINUED | OUTPATIENT
Start: 2019-03-18 | End: 2019-03-20

## 2019-03-18 RX ORDER — DOCUSATE SODIUM 100 MG
100 CAPSULE ORAL DAILY
Qty: 0 | Refills: 0 | Status: DISCONTINUED | OUTPATIENT
Start: 2019-03-18 | End: 2019-03-20

## 2019-03-18 RX ADMIN — Medication 10 MILLIGRAM(S): at 13:47

## 2019-03-18 RX ADMIN — Medication 10 MILLIGRAM(S): at 05:48

## 2019-03-18 RX ADMIN — CLOPIDOGREL BISULFATE 75 MILLIGRAM(S): 75 TABLET, FILM COATED ORAL at 11:26

## 2019-03-18 RX ADMIN — DAPTOMYCIN 120 MILLIGRAM(S): 500 INJECTION, POWDER, LYOPHILIZED, FOR SOLUTION INTRAVENOUS at 05:47

## 2019-03-18 RX ADMIN — BENZOCAINE AND MENTHOL 1 LOZENGE: 5; 1 LIQUID ORAL at 13:47

## 2019-03-18 RX ADMIN — Medication 81 MILLIGRAM(S): at 11:26

## 2019-03-18 RX ADMIN — Medication 4: at 08:15

## 2019-03-18 RX ADMIN — Medication 50 GRAM(S): at 05:47

## 2019-03-18 RX ADMIN — Medication 4: at 12:20

## 2019-03-18 RX ADMIN — SIMVASTATIN 10 MILLIGRAM(S): 20 TABLET, FILM COATED ORAL at 21:36

## 2019-03-18 RX ADMIN — Medication 6: at 17:31

## 2019-03-18 RX ADMIN — INSULIN GLARGINE 5 UNIT(S): 100 INJECTION, SOLUTION SUBCUTANEOUS at 21:37

## 2019-03-18 RX ADMIN — Medication 10 MILLIGRAM(S): at 21:36

## 2019-03-18 NOTE — PROGRESS NOTE ADULT - ASSESSMENT
68M with CHB vs. SSS s/p PPM (Medtronic), HTN, HLD, DM2, PVD with Bilateral LE Stents and GERD transferred from [facility] for lead extraction 2/2 infected pacemaker. Underwent attempted system extraction with Dr. Dhaliwal. Right IJ active fixation temporary pacemaker with externalized Medtronic generator was placed. At the time of attempted extraction, the generator was removed, but the remaining pocket leads were cut and retracted to the central circulation. Remains with 3 intravascular pacing leads. Currently, VVI paced at HR 80.    Problem: PPM infection   -- Plan for OR Tuesday for lead extraction   -- NPO after MN  -- Active Type and Screen (3/18)  -- Continue IV Daptomycin 500mg QD per ID Coag negative Staph on superficial wound culture. All peripheral blood cx from OSH negative.   -- Cont to monitor CPK while on Daptomycin    -- Cont to monitor BMP, especially FRANCIS. Keep K+> 4.0 and Mg++>2.0 68M with CHB vs. SSS s/p PPM (Medtronic), HTN, HLD, DM2, PVD with Bilateral LE Stents and GERD transferred from [facility] for lead extraction 2/2 infected pacemaker. Underwent attempted system extraction with Dr. Dhaliwal. Right IJ active fixation temporary pacemaker with externalized Medtronic generator was placed. At the time of attempted extraction, the generator was removed, but the remaining pocket leads were cut and retracted to the central circulation. Remains with 3 intravascular pacing leads. Currently, VVI paced at HR 80.    RA Lead:   Problem: PPM infection   -- Plan for OR Tuesday for lead extraction   -- NPO after MN  -- Active Type and Screen (3/18)  -- Continue IV Daptomycin 500mg QD per ID Coag negative Staph on superficial wound culture. All peripheral blood cx from OSH negative.   -- Cont to monitor CPK while on Daptomycin    -- Cont to monitor BMP, especially FRANCIS. Keep K+> 4.0 and Mg++>2.0; Serum Cr trending down

## 2019-03-18 NOTE — CHART NOTE - NSCHARTNOTEFT_GEN_A_CORE
====================  CCU MIDNIGHT ROUNDS  ====================    GUSTAVO LOVELL  [id number]    ====================  SUMMARY: HPI:  This is a 68 year old man with past medical history of Sick Sinus Syndrome with PPM (Medtronic), T2DM, HTN, HLD, PVD with Bilateral LE Stents and GERD transferred from [facility] for lead extraction 2/2 infected Pacemaker.  Patient states he had a battery change last year and his PPM migrated out through incision site.  Chart discussed erosion of generator and Coag-negative Staph infection of PPM pocket site and negative blood cultures, received Vanc and Zosyn from 3/7-3/12 and started on Daptomycin 500mg IV daily since 3/13.  Pacemaker extracted and leads attached to PPM near RIJ dressed sterilely.  During course of hospitalization patient developed FRANCIS slowly resolving, creatinine is 1.53 at [facility] (high 2.2 at [facility]).  Patient denies fever, chills, syncope, recent sick contact, SOB, chest pain, abdominal pain, N/V/D. (17 Mar 2019 15:50)    ====================        ====================  NEW EVENTS:  ====================  No events.      ====================  VITALS (Last 12 hrs):  ====================    T(C): 37 (03-18-19 @ 19:00), Max: 37 (03-18-19 @ 19:00)  HR: 78 (03-18-19 @ 21:00) (78 - 80)  BP: 157/82 (03-18-19 @ 21:00) (92/54 - 160/69)  BP(mean): 106 (03-18-19 @ 21:00) (73 - 106)  RR: 20 (03-18-19 @ 21:00) (13 - 24)  SpO2: 98% (03-18-19 @ 21:00) (96% - 99%)      TELEMETRY: Paced rhythm      I&O's Summary    17 Mar 2019 07:01  -  18 Mar 2019 07:00  --------------------------------------------------------  IN: 480 mL / OUT: 800 mL / NET: -320 mL    18 Mar 2019 07:01  -  18 Mar 2019 21:38  --------------------------------------------------------  IN: 600 mL / OUT: 300 mL / NET: 300 mL        ====================  PLAN:  ====================  #Infected PPM  - NPO after midnight for lead extraction and possible micra device  - Cont daptomycin for PPM infection, check CPK weekly while on dapto. F/u BCx. ID recs appreciated.  - Monitor FRANCIS, trend I/Os, BMP, lytes, avoid nephrotoxic agents  - TTE EF 70%, no wall motion abnormalities  - Cont DAPT, statin for b/l LE stents  - Cont hydralazine    Jose Liao, CCU PA-C  #[id number]

## 2019-03-18 NOTE — PHARMACOTHERAPY INTERVENTION NOTE - COMMENTS
Patient is currently on daptomycin for infected pacemaker. Patient is s/p vanco and zosyn 3/7-3/12 at [facility]. Was switched to daptomycin from vancomyin because of FRANCIS (patient's current Scr is 1.52). Ordered baseline CK level. [facility] records show only positive cx in wound (CoNS) and negative blood cultures. Current blood cultures pending.

## 2019-03-18 NOTE — PROGRESS NOTE ADULT - SUBJECTIVE AND OBJECTIVE BOX
24H hour events: No acute events overnight, pt found resting comfortably in bed     MEDICATIONS:  aspirin enteric coated 81 milliGRAM(s) Oral daily  clopidogrel Tablet 75 milliGRAM(s) Oral daily  hydrALAZINE 10 milliGRAM(s) Oral every 8 hours  DAPTOmycin IVPB 500 milliGRAM(s) IV Intermittent every 24 hours  insulin glargine Injectable (LANTUS) 5 Unit(s) SubCutaneous at bedtime  insulin lispro (HumaLOG) corrective regimen sliding scale   SubCutaneous three times a day before meals  insulin lispro (HumaLOG) corrective regimen sliding scale   SubCutaneous at bedtime  simvastatin 10 milliGRAM(s) Oral at bedtime    REVIEW OF SYSTEMS:  General: denies fever and chills  Neurological: denies HA, Weakness  Cardiac: c/o mild lightheadedness upon ambulation, denies chest pain, palpitations,  Respiratory: denies SOB, coughing, wheezing   Abdominal: denies abdominal pain/discomfort, N/V/D   Peripheral Neurovascular: denies numbness/tingling to LE     PHYSICAL EXAM:  T(C): 37.1 (03-18-19 @ 07:00), Max: 37.3 (03-17-19 @ 19:00)  HR: 78 (03-18-19 @ 09:00) (78 - 80)  BP: 128/74 (03-18-19 @ 09:00) (85/49 - 183/76)  RR: 18 (03-18-19 @ 09:00) (15 - 26)  SpO2: 98% (03-18-19 @ 09:00) (96% - 99%)    I&O's Summary  17 Mar 2019 07:01  -  18 Mar 2019 07:00  --------------------------------------------------------  IN: 480 mL / OUT: 800 mL / NET: -320 mL      Appearance: Alert. NAD	  Cardiovascular: +S1S2 RRR no m/g/r  Respiratory: CTA B/L	  Psychiatry: A & O x 3, Mood & affect appropriate  Gastrointestinal:  Soft, NT. ND. +BS	  Skin: Left infraclavicular surgical site s/p PPM removal with DSD. R DSD intact on External fixed temporary pacing wire site  Neurologic: Non-focal  Extremities: No edema BLE  Vascular: Peripheral pulses palpable 2+ bilaterally      LABS:	 	  CBC Full  -  ( 18 Mar 2019 02:53 )  WBC Count : 5.0 K/uL  Hemoglobin : 12.1 g/dL  Hematocrit : 36.0 %  Platelet Count - Automated : 163 K/uL  Mean Cell Volume : 85.6 fl  Mean Cell Hemoglobin : 28.8 pg  Mean Cell Hemoglobin Concentration : 33.7 gm/dL    03-18    139  |  103  |  19  ----------------------------<  222<H>  4.2   |  21<L>  |  1.52<H>  03-17    140  |  102  |  18  ----------------------------<  274<H>  4.7   |  20<L>  |  1.53<H>    Ca    9.1      18 Mar 2019 02:53  Ca    9.9      17 Mar 2019 14:56  Phos  3.7     03-18  Phos  3.3     03-17  Mg     1.8     03-18  Mg     1.9     03-17    TPro  7.6  /  Alb  3.6  /  TBili  0.5  /  DBili  x   /  AST  19  /  ALT  23  /  AlkPhos  51  03-17    TELEMETRY: Pacer dependent, underlying rhythm CHB   	    ECG: 24H hour events:   No acute events overnight, pt found resting comfortably in bed     MEDICATIONS:  aspirin enteric coated 81 milliGRAM(s) Oral daily  clopidogrel Tablet 75 milliGRAM(s) Oral daily  hydrALAZINE 10 milliGRAM(s) Oral every 8 hours  DAPTOmycin IVPB 500 milliGRAM(s) IV Intermittent every 24 hours  insulin glargine Injectable (LANTUS) 5 Unit(s) SubCutaneous at bedtime  insulin lispro (HumaLOG) corrective regimen sliding scale   SubCutaneous three times a day before meals  insulin lispro (HumaLOG) corrective regimen sliding scale   SubCutaneous at bedtime  simvastatin 10 milliGRAM(s) Oral at bedtime    REVIEW OF SYSTEMS:  General: denies fever and chills  Neurological: denies HA, Weakness  Cardiac: c/o mild lightheadedness upon ambulation, denies chest pain, palpitations,  Respiratory: denies SOB, coughing, wheezing   Abdominal: denies abdominal pain/discomfort, N/V/D   Peripheral Neurovascular: denies numbness/tingling to LE     PHYSICAL EXAM:  T(C): 37.1 (03-18-19 @ 07:00), Max: 37.3 (03-17-19 @ 19:00)  HR: 78 (03-18-19 @ 09:00) (78 - 80)  BP: 128/74 (03-18-19 @ 09:00) (85/49 - 183/76)  RR: 18 (03-18-19 @ 09:00) (15 - 26)  SpO2: 98% (03-18-19 @ 09:00) (96% - 99%)    I&O's Summary  17 Mar 2019 07:01  -  18 Mar 2019 07:00  --------------------------------------------------------  IN: 480 mL / OUT: 800 mL / NET: -320 mL      Appearance: Alert. NAD	  Cardiovascular: +S1S2 RRR no m/g/r  Respiratory: CTA B/L	  Psychiatry: A & O x 3, Mood & affect appropriate  Gastrointestinal:  Soft, NT. ND. +BS	  Skin: Left infraclavicular surgical site s/p PPM removal with DSD. R DSD intact connected to Externalized temporary pacing wire site  Neurologic: Non-focal  Extremities: No edema BLE  Vascular: Peripheral pulses palpable 2+ bilaterally      LABS:	 	  CBC Full  -  ( 18 Mar 2019 02:53 )  WBC Count : 5.0 K/uL  Hemoglobin : 12.1 g/dL  Hematocrit : 36.0 %  Platelet Count - Automated : 163 K/uL  Mean Cell Volume : 85.6 fl  Mean Cell Hemoglobin : 28.8 pg  Mean Cell Hemoglobin Concentration : 33.7 gm/dL    03-18    139  |  103  |  19  ----------------------------<  222<H>  4.2   |  21<L>  |  1.52<H>  03-17    140  |  102  |  18  ----------------------------<  274<H>  4.7   |  20<L>  |  1.53<H>    Ca    9.1      18 Mar 2019 02:53  Ca    9.9      17 Mar 2019 14:56  Phos  3.7     03-18  Phos  3.3     03-17  Mg     1.8     03-18  Mg     1.9     03-17    TPro  7.6  /  Alb  3.6  /  TBili  0.5  /  DBili  x   /  AST  19  /  ALT  23  /  AlkPhos  51  03-17    TELEMETRY: Vpaced 80's, Pacer dependent with underlying rhythm CHB

## 2019-03-18 NOTE — PROGRESS NOTE ADULT - NSICDXPROBLEM_GEN_ALL_CORE_FT
PROBLEM DIAGNOSES  Problem: FRANCIS (acute kidney injury)  Assessment and Plan: - FRANCIS from vanc at OSH, SCr now 1.52  - trend SCr, renally dose medications, avoid nephrotoxic agents    Problem: Hypertension  Assessment and Plan: - c/w hydralazine    Problem: Type 2 diabetes mellitus  Assessment and Plan: - A1C 8.3, c/w Lantus, SSI    Problem: Infected pacemaker  Assessment and Plan: - SSS s/p Medtronic PPM with battery exchange last year, now transferred here for lead extraction   - at [place] - attempted extraction, right IJ active fixation temporary pacemaker with externalized Medtronic generator placed, remains with 3 intravascular pacing leads  - plan for complete lead extraction  - PPM wound site with coag negative staph  - s/p vanc and zosyn 3/7-3/12, now on Daptomycin due to FRANCIS.   - BCx pending, weekly CPK while on Daptomycin PROBLEM DIAGNOSES  Problem: Infected pacemaker  Assessment and Plan: - SSS s/p Medtronic PPM with battery exchange last year, now transferred here for lead extraction   - at [place] - attempted extraction, right IJ active fixation temporary pacemaker with externalized Medtronic generator placed, remains with 3 intravascular pacing leads  - plan for complete lead extraction tomorrow   - PPM wound site with coag negative staph  - s/p vanc and zosyn 3/7-3/12, now on Daptomycin due to FRANCIS  - BCx pending, weekly CPK while on Daptomycin     Problem: FRANCIS (acute kidney injury)  Assessment and Plan: - FRANCIS from vanc at OSH, SCr now 1.52  - trend SCr, renally dose medications, avoid nephrotoxic agents    Problem: Hypertension  Assessment and Plan: - c/w hydralazine    Problem: Peripheral vascular disease  Assessment and Plan: - s/p b/l LE stents   - c/w DAPT asa and plavix     Problem: Type 2 diabetes mellitus  Assessment and Plan: - A1C 8.3, c/w Lantus, SSI PROBLEM DIAGNOSES  Problem: Infected pacemaker  Assessment and Plan: - SSS s/p Medtronic PPM with battery exchange last year, now transferred here for lead extraction   - at [place] - attempted extraction, right IJ active fixation temporary pacemaker with externalized Medtronic generator placed, remains with 3 intravascular pacing leads  - plan for complete lead extraction tomorrow   - TTE 3/18 without evidence of endocarditis   - PPM wound site with coag negative staph, s/p vanc and zosyn 3/7-3/12, now on Daptomycin due to FRANCIS  - BCx pending, weekly CPK while on Daptomycin       Problem: FRANCIS (acute kidney injury)  Assessment and Plan: - FRANCIS from vanc at OSH, SCr now 1.52  - trend SCr, renally dose medications, avoid nephrotoxic agents    Problem: Hypertension  Assessment and Plan: - c/w hydralazine    Problem: Peripheral vascular disease  Assessment and Plan: - s/p b/l LE stents   - c/w DAPT asa and plavix     Problem: Type 2 diabetes mellitus  Assessment and Plan: - A1C 8.3, c/w Lantus, SSI PROBLEM DIAGNOSES  Problem: Infected pacemaker  Assessment and Plan: - SSS s/p Medtronic PPM 1995 with battery exchange last year, now transferred here for lead extraction   - attempted extraction with removal of generator, remains with 3 intravascular pacing leads, right IJ active fixation temporary pacemaker with externalized Medtronic generator placed  - plan for complete lead extraction tomorrow   - TTE 3/18 without evidence of endocarditis   - PPM wound site with coag negative staph, s/p vanc and zosyn 3/7-3/12, now on Daptomycin due to FRANCIS  - BCx pending, weekly CPK while on Daptomycin       Problem: FRANCIS (acute kidney injury)  Assessment and Plan: - FRANCIS from vanc at OSH, SCr now 1.52  - trend SCr, renally dose medications, avoid nephrotoxic agents    Problem: Hypertension  Assessment and Plan: - c/w hydralazine    Problem: Peripheral vascular disease  Assessment and Plan: - s/p b/l LE stents   - c/w DAPT asa and plavix     Problem: Type 2 diabetes mellitus  Assessment and Plan: - A1C 8.3, c/w Lantus, SSI

## 2019-03-18 NOTE — CHART NOTE - NSCHARTNOTEFT_GEN_A_CORE
CCU Transfer Note    Transfer from: CCU    Transfer to: (  ) Medicine    (x) Telemetry     (   ) RCU        (    ) Palliative         (   ) Stroke Unit       (  ) MICU   (   ) __________________    Accepting Physician:     Signout given to:     CCU COURSE:    PAST MEDICAL & SURGICAL HISTORY:  Peripheral vascular disease  Hyperlipidemia  Hypertension  Chronic GERD  History of sick sinus syndrome  Type 2 diabetes mellitus  Presence of permanent cardiac pacemaker    Vital Signs Last 24 Hrs  T(C): 37.1 (18 Mar 2019 07:00), Max: 37.3 (17 Mar 2019 19:00)  T(F): 98.7 (18 Mar 2019 07:00), Max: 99.2 (17 Mar 2019 19:00)  HR: 78 (18 Mar 2019 11:00) (78 - 80)  BP: 99/58 (18 Mar 2019 11:00) (85/49 - 183/76)  BP(mean): 73 (18 Mar 2019 11:00) (58 - 116)  RR: 20 (18 Mar 2019 11:00) (15 - 26)  SpO2: 98% (18 Mar 2019 11:00) (96% - 99%)  I&O's Summary    17 Mar 2019 07:01  -  18 Mar 2019 07:00  --------------------------------------------------------  IN: 480 mL / OUT: 800 mL / NET: -320 mL    18 Mar 2019 07:01  -  18 Mar 2019 11:37  --------------------------------------------------------  IN: 240 mL / OUT: 0 mL / NET: 240 mL    PHYSICAL EXAMINATION:  General: WN/WD NAD  HEENT: PERRLA, EOMI, moist mucous membranes  Neurology: A&Ox3, nonfocal, VERGARA x 4  Respiratory: CTA B/L, normal respiratory effort, no wheezes, crackles, rales  CV: RRR, S1S2, PPM extraction leads attached externally to temporary PPM   Abdominal: Soft, NT, ND +BS, Last BM  Extremities: No edema, + peripheral pulses    Allergies contrast media (iodine-based) (Short breath)    Intolerances    MEDICATIONS  (STANDING):  aspirin enteric coated 81 milliGRAM(s) Oral daily  clopidogrel Tablet 75 milliGRAM(s) Oral daily  DAPTOmycin IVPB 500 milliGRAM(s) IV Intermittent every 24 hours  docusate sodium 100 milliGRAM(s) Oral daily  hydrALAZINE 10 milliGRAM(s) Oral every 8 hours  insulin glargine Injectable (LANTUS) 5 Unit(s) SubCutaneous at bedtime  insulin lispro (HumaLOG) corrective regimen sliding scale   SubCutaneous three times a day before meals  insulin lispro (HumaLOG) corrective regimen sliding scale   SubCutaneous at bedtime  senna 1 Tablet(s) Oral at bedtime  simvastatin 10 milliGRAM(s) Oral at bedtime    MEDICATIONS  (PRN):                        12.1   5.0   )-----------( 163      ( 18 Mar 2019 02:53 )             36.0     03-18    139  |  103  |  19  ----------------------------<  222<H>  4.2   |  21<L>  |  1.52<H>    Ca    9.1      18 Mar 2019 02:53  Phos  3.7     03-18  Mg     1.8     03-18    TPro  7.6  /  Alb  3.6  /  TBili  0.5  /  DBili  x   /  AST  19  /  ALT  23  /  AlkPhos  51  03-17    PT/INR - ( 18 Mar 2019 02:53 )   PT: 13.4 sec;   INR: 1.17 ratio       PTT - ( 17 Mar 2019 14:56 )  PTT:27.3 sec    ASSESSMENT & PLAN:     Problem: Infected pacemaker  Assessment and Plan:   - SSS s/p Medtronic PPM with battery exchange last year, now transferred here for lead extraction   - at [place] - attempted extraction, right IJ active fixation temporary pacemaker with externalized Medtronic generator placed, remains with 3 intravascular pacing leads  - plan for complete lead extraction tomorrow 3/18  - PPM wound site with coag negative staph  - s/p vanc and zosyn 3/7-3/12, now on Daptomycin due to FRANCIS.   - BCx pending, weekly CPK while on Daptomycin.      Problem: FRANCIS (acute kidney injury)  Assessment and Plan:   - FRANCIS from vanc at OSH, SCr now 1.52  - trend SCr, renally dose medications, avoid nephrotoxic agents    Problem: Hypertension  Assessment and Plan:   - c/w hydralazine    Problem: Type 2 diabetes mellitus  Assessment and Plan:   - A1C 8.3, c/w Lantus, SSI      FOR FOLLOW UP:  [ ] EP recs  [ ] ID recs  [ ] weekly CPK level while on daptomycin CCU Transfer Note    Transfer from: CCU    Transfer to: (  ) Medicine    (x) Telemetry     (   ) RCU        (    ) Palliative         (   ) Stroke Unit       (  ) MICU   (   ) __________________    Accepting Physician:     Signout given to:     CCU COURSE:    68M with CHB vs. SSS s/p PPM (Medtronic), PVD with Bilateral LE Stents transferred from [facility] for lead extraction 2/2 infected pacemaker. Underwent attempted system extraction with Dr. Dhaliwal. Right IJ active fixation temporary pacemaker with externalized Medtronic generator was placed. At the time of attempted extraction, the generator was removed, but the remaining pocket leads were cut and retracted to the central circulation. Remains with multiple intravascular pacing leads. Currently, VVI paced at HR 80. Plan for complete lead extraction 3/18. Generator pocket with coag negative staph, was on vancomycin and zosyn, now on daptomycin due to FRANCIS.     PAST MEDICAL & SURGICAL HISTORY:  Peripheral vascular disease  Hyperlipidemia  Hypertension  Chronic GERD  History of sick sinus syndrome  Type 2 diabetes mellitus  Presence of permanent cardiac pacemaker    Vital Signs Last 24 Hrs  T(C): 37.1 (18 Mar 2019 07:00), Max: 37.3 (17 Mar 2019 19:00)  T(F): 98.7 (18 Mar 2019 07:00), Max: 99.2 (17 Mar 2019 19:00)  HR: 78 (18 Mar 2019 11:00) (78 - 80)  BP: 99/58 (18 Mar 2019 11:00) (85/49 - 183/76)  BP(mean): 73 (18 Mar 2019 11:00) (58 - 116)  RR: 20 (18 Mar 2019 11:00) (15 - 26)  SpO2: 98% (18 Mar 2019 11:00) (96% - 99%)  I&O's Summary    17 Mar 2019 07:01  -  18 Mar 2019 07:00  --------------------------------------------------------  IN: 480 mL / OUT: 800 mL / NET: -320 mL    18 Mar 2019 07:01  -  18 Mar 2019 11:37  --------------------------------------------------------  IN: 240 mL / OUT: 0 mL / NET: 240 mL    PHYSICAL EXAMINATION:  General: WN/WD NAD  HEENT: PERRLA, EOMI, moist mucous membranes  Neurology: A&Ox3, nonfocal, VERGARA x 4  Respiratory: CTA B/L, normal respiratory effort, no wheezes, crackles, rales  CV: RRR, S1S2, PPM extraction leads attached externally to temporary PPM   Abdominal: Soft, NT, ND +BS, Last BM  Extremities: No edema, + peripheral pulses    Allergies contrast media (iodine-based) (Short breath)    Intolerances    MEDICATIONS  (STANDING):  aspirin enteric coated 81 milliGRAM(s) Oral daily  clopidogrel Tablet 75 milliGRAM(s) Oral daily  DAPTOmycin IVPB 500 milliGRAM(s) IV Intermittent every 24 hours  docusate sodium 100 milliGRAM(s) Oral daily  hydrALAZINE 10 milliGRAM(s) Oral every 8 hours  insulin glargine Injectable (LANTUS) 5 Unit(s) SubCutaneous at bedtime  insulin lispro (HumaLOG) corrective regimen sliding scale   SubCutaneous three times a day before meals  insulin lispro (HumaLOG) corrective regimen sliding scale   SubCutaneous at bedtime  senna 1 Tablet(s) Oral at bedtime  simvastatin 10 milliGRAM(s) Oral at bedtime    MEDICATIONS  (PRN):                        12.1   5.0   )-----------( 163      ( 18 Mar 2019 02:53 )             36.0     03-18    139  |  103  |  19  ----------------------------<  222<H>  4.2   |  21<L>  |  1.52<H>    Ca    9.1      18 Mar 2019 02:53  Phos  3.7     03-18  Mg     1.8     03-18    TPro  7.6  /  Alb  3.6  /  TBili  0.5  /  DBili  x   /  AST  19  /  ALT  23  /  AlkPhos  51  03-17    PT/INR - ( 18 Mar 2019 02:53 )   PT: 13.4 sec;   INR: 1.17 ratio       PTT - ( 17 Mar 2019 14:56 )  PTT:27.3 sec    ASSESSMENT & PLAN:     Problem: Infected pacemaker  Assessment and Plan:   - SSS s/p Medtronic PPM with battery exchange last year, now transferred here for lead extraction   - at [place] - attempted extraction, right IJ active fixation temporary pacemaker with externalized Medtronic generator placed, remains with 3 intravascular pacing leads  - plan for complete lead extraction tomorrow 3/18  - PPM wound site with coag negative staph  - s/p vanc and zosyn 3/7-3/12, now on Daptomycin due to FRANCIS.   - BCx pending, weekly CPK while on Daptomycin.      Problem: FRANCIS (acute kidney injury)  Assessment and Plan:   - FRANCIS from vanc at OSH, SCr now 1.52  - trend SCr, renally dose medications, avoid nephrotoxic agents    Problem: Hypertension  Assessment and Plan:   - c/w hydralazine    Problem: Type 2 diabetes mellitus  Assessment and Plan:   - A1C 8.3, c/w Lantus, SSI      FOR FOLLOW UP:  [ ] EP recs  [ ] ID recs  [ ] weekly CPK level while on daptomycin CCU Transfer Note    Transfer from: CCU    Transfer to: (  ) Medicine    (x) Telemetry     (   ) RCU        (    ) Palliative         (   ) Stroke Unit       (  ) MICU   (   ) __________________    Accepting Physician:     Signout given to:     CCU COURSE:    68M with CHB vs. SSS s/p PPM (Medtronic), PVD with Bilateral LE Stents transferred from [facility] for lead extraction 2/2 infected pacemaker. Underwent attempted system extraction with Dr. Dhaliwal. Right IJ active fixation temporary pacemaker with externalized Medtronic generator was placed. At the time of attempted extraction, the generator was removed, but the remaining pocket leads were cut and retracted to the central circulation. Remains with multiple intravascular pacing leads. Currently, VVI paced at HR 80. Plan for complete lead extraction 3/18. Generator pocket with coag negative staph, was on vancomycin and zosyn, now on daptomycin due to FRANCIS.     PAST MEDICAL & SURGICAL HISTORY:  Peripheral vascular disease  Hyperlipidemia  Hypertension  Chronic GERD  History of sick sinus syndrome  Type 2 diabetes mellitus  Presence of permanent cardiac pacemaker    Vital Signs Last 24 Hrs  T(C): 37.1 (18 Mar 2019 07:00), Max: 37.3 (17 Mar 2019 19:00)  T(F): 98.7 (18 Mar 2019 07:00), Max: 99.2 (17 Mar 2019 19:00)  HR: 78 (18 Mar 2019 11:00) (78 - 80)  BP: 99/58 (18 Mar 2019 11:00) (85/49 - 183/76)  BP(mean): 73 (18 Mar 2019 11:00) (58 - 116)  RR: 20 (18 Mar 2019 11:00) (15 - 26)  SpO2: 98% (18 Mar 2019 11:00) (96% - 99%)  I&O's Summary    17 Mar 2019 07:01  -  18 Mar 2019 07:00  --------------------------------------------------------  IN: 480 mL / OUT: 800 mL / NET: -320 mL    18 Mar 2019 07:01  -  18 Mar 2019 11:37  --------------------------------------------------------  IN: 240 mL / OUT: 0 mL / NET: 240 mL    PHYSICAL EXAMINATION:  General: WN/WD NAD  HEENT: PERRLA, EOMI, moist mucous membranes  Neurology: A&Ox3, nonfocal, VERGARA x 4  Respiratory: CTA B/L, normal respiratory effort, no wheezes, crackles, rales  CV: RRR, S1S2, PPM extraction leads attached externally to temporary PPM   Abdominal: Soft, NT, ND +BS, Last BM  Extremities: No edema, + peripheral pulses    Allergies contrast media (iodine-based) (Short breath)    Intolerances    MEDICATIONS  (STANDING):  aspirin enteric coated 81 milliGRAM(s) Oral daily  clopidogrel Tablet 75 milliGRAM(s) Oral daily  DAPTOmycin IVPB 500 milliGRAM(s) IV Intermittent every 24 hours  docusate sodium 100 milliGRAM(s) Oral daily  hydrALAZINE 10 milliGRAM(s) Oral every 8 hours  insulin glargine Injectable (LANTUS) 5 Unit(s) SubCutaneous at bedtime  insulin lispro (HumaLOG) corrective regimen sliding scale   SubCutaneous three times a day before meals  insulin lispro (HumaLOG) corrective regimen sliding scale   SubCutaneous at bedtime  senna 1 Tablet(s) Oral at bedtime  simvastatin 10 milliGRAM(s) Oral at bedtime    MEDICATIONS  (PRN):                        12.1   5.0   )-----------( 163      ( 18 Mar 2019 02:53 )             36.0     03-18    139  |  103  |  19  ----------------------------<  222<H>  4.2   |  21<L>  |  1.52<H>    Ca    9.1      18 Mar 2019 02:53  Phos  3.7     03-18  Mg     1.8     03-18    TPro  7.6  /  Alb  3.6  /  TBili  0.5  /  DBili  x   /  AST  19  /  ALT  23  /  AlkPhos  51  03-17    PT/INR - ( 18 Mar 2019 02:53 )   PT: 13.4 sec;   INR: 1.17 ratio       PTT - ( 17 Mar 2019 14:56 )  PTT:27.3 sec    ASSESSMENT & PLAN:     68M with CHB vs. SSS s/p PPM (Medtronic), PVD with Bilateral LE Stents transferred from [facility] for lead extraction 2/2 infected pacemaker.     Problem: Infected pacemaker  Assessment and Plan:   - SSS s/p Medtronic PPM placed 1995 with battery exchange last year, now transferred here for lead extraction   - at [place] - attempted extraction, generator removed, remains with 3 intravascular pacing leads, plan for complete lead extraction tomorrow 3/18  - right IJ active fixation temporary pacemaker with externalized Medtronic generator placed in the interim   - PPM wound site with coag negative staph, s/p vanc and zosyn 3/7-3/12, now on Daptomycin due to FRANCIS  - BCx pending, weekly CPK while on Daptomycin.    Problem: FRANCIS (acute kidney injury)  Assessment and Plan:   - FRANCIS from vanc at OSH, SCr now 1.52, unclear baseline   - trend SCr, renally dose medications, avoid nephrotoxic agents    Problem: Peripheral vascular disease  Assessment and Plan: - s/p b/l LE stents   - c/w DAPT asa and plavix     Problem: Hypertension  Assessment and Plan:   - c/w hydralazine 10mg TID    Problem: Type 2 diabetes mellitus  Assessment and Plan:   - A1C 8.3, c/w Lantus, SSI    FOR FOLLOW UP:  [ ] EP recs  [ ] ID recs  [ ] weekly CPK level while on daptomycin CCU Transfer Note    Transfer from: CCU    Transfer to: (  ) Medicine    (x) Telemetry     (   ) RCU        (    ) Palliative         (   ) Stroke Unit       (  ) MICU   (   ) __________________    Accepting Physician:     Signout given to:     CCU COURSE:    68M with CHB vs. SSS s/p PPM (Medtronic), PVD with Bilateral LE Stents transferred from [facility] for lead extraction 2/2 infected pacemaker. Underwent attempted system extraction with Dr. Dhaliwal. Right IJ active fixation temporary pacemaker with externalized Medtronic generator was placed. At the time of attempted extraction, the generator was removed, but the remaining pocket leads were cut and retracted to the central circulation. Remains with multiple intravascular pacing leads. Currently, VVI paced at HR 80. Plan for complete lead extraction 3/18. Generator pocket with coag negative staph, was on vancomycin and zosyn, now on daptomycin due to FRANCIS.     PAST MEDICAL & SURGICAL HISTORY:  Peripheral vascular disease  Hyperlipidemia  Hypertension  Chronic GERD  History of sick sinus syndrome  Type 2 diabetes mellitus  Presence of permanent cardiac pacemaker    Vital Signs Last 24 Hrs  T(C): 37.1 (18 Mar 2019 07:00), Max: 37.3 (17 Mar 2019 19:00)  T(F): 98.7 (18 Mar 2019 07:00), Max: 99.2 (17 Mar 2019 19:00)  HR: 78 (18 Mar 2019 11:00) (78 - 80)  BP: 99/58 (18 Mar 2019 11:00) (85/49 - 183/76)  BP(mean): 73 (18 Mar 2019 11:00) (58 - 116)  RR: 20 (18 Mar 2019 11:00) (15 - 26)  SpO2: 98% (18 Mar 2019 11:00) (96% - 99%)  I&O's Summary    17 Mar 2019 07:01  -  18 Mar 2019 07:00  --------------------------------------------------------  IN: 480 mL / OUT: 800 mL / NET: -320 mL    18 Mar 2019 07:01  -  18 Mar 2019 11:37  --------------------------------------------------------  IN: 240 mL / OUT: 0 mL / NET: 240 mL    PHYSICAL EXAMINATION:  General: WN/WD NAD  HEENT: PERRLA, EOMI, moist mucous membranes  Neurology: A&Ox3, nonfocal, VERGARA x 4  Respiratory: CTA B/L, normal respiratory effort, no wheezes, crackles, rales  CV: RRR, S1S2, PPM extraction leads attached externally to temporary PPM   Abdominal: Soft, NT, ND +BS, Last BM  Extremities: No edema, + peripheral pulses    Allergies contrast media (iodine-based) (Short breath)    Intolerances    MEDICATIONS  (STANDING):  aspirin enteric coated 81 milliGRAM(s) Oral daily  clopidogrel Tablet 75 milliGRAM(s) Oral daily  DAPTOmycin IVPB 500 milliGRAM(s) IV Intermittent every 24 hours  docusate sodium 100 milliGRAM(s) Oral daily  hydrALAZINE 10 milliGRAM(s) Oral every 8 hours  insulin glargine Injectable (LANTUS) 5 Unit(s) SubCutaneous at bedtime  insulin lispro (HumaLOG) corrective regimen sliding scale   SubCutaneous three times a day before meals  insulin lispro (HumaLOG) corrective regimen sliding scale   SubCutaneous at bedtime  senna 1 Tablet(s) Oral at bedtime  simvastatin 10 milliGRAM(s) Oral at bedtime    MEDICATIONS  (PRN):                        12.1   5.0   )-----------( 163      ( 18 Mar 2019 02:53 )             36.0     03-18    139  |  103  |  19  ----------------------------<  222<H>  4.2   |  21<L>  |  1.52<H>    Ca    9.1      18 Mar 2019 02:53  Phos  3.7     03-18  Mg     1.8     03-18    TPro  7.6  /  Alb  3.6  /  TBili  0.5  /  DBili  x   /  AST  19  /  ALT  23  /  AlkPhos  51  03-17    PT/INR - ( 18 Mar 2019 02:53 )   PT: 13.4 sec;   INR: 1.17 ratio       PTT - ( 17 Mar 2019 14:56 )  PTT:27.3 sec    ASSESSMENT & PLAN:     68M with CHB vs. SSS s/p PPM (Medtronic), PVD with Bilateral LE Stents transferred from [facility] for lead extraction 2/2 infected pacemaker.     Problem: Infected pacemaker  Assessment and Plan:   - SSS s/p Medtronic PPM placed 1995 with battery exchange last year, now transferred here for lead extraction   - at [place] - attempted extraction, generator removed, remains with 3 intravascular pacing leads, plan for complete lead extraction tomorrow 3/19  - right IJ active fixation temporary pacemaker with externalized Medtronic generator placed in the interim   - PPM wound site with coag negative staph, s/p vanc and zosyn 3/7-3/12, now on Daptomycin due to FRANCIS  - BCx pending, weekly CPK while on Daptomycin.    Problem: FRANCIS (acute kidney injury)  Assessment and Plan:   - FRANCIS from vanc at OSH, SCr now 1.52, unclear baseline   - trend SCr, renally dose medications, avoid nephrotoxic agents    Problem: Peripheral vascular disease  Assessment and Plan: - s/p b/l LE stents   - c/w DAPT asa and plavix     Problem: Hypertension  Assessment and Plan:   - c/w hydralazine 10mg TID    Problem: Type 2 diabetes mellitus  Assessment and Plan:   - A1C 8.3, c/w Lantus, SSI    FOR FOLLOW UP:  [ ] EP recs  [ ] ID recs  [ ] weekly CPK level while on daptomycin

## 2019-03-18 NOTE — PROGRESS NOTE ADULT - SUBJECTIVE AND OBJECTIVE BOX
PATIENT:  GUSTAVO LOVELL  [id number]    CHIEF COMPLAINT:  Patient is a 68y old  Male who presents with a chief complaint of Lead Extraction 2/2 Infected PPM (17 Mar 2019 16:24)    INTERVAL HISTORY/OVERNIGHT EVENTS: No acute events overnight, pt. NPO for lead extraction today.     REVIEW OF SYSTEMS:    Constitutional:     [ ] negative [ ] fevers [ ] chills [ ] weight loss [ ] weight gain  HEENT:                  [ ] negative [ ] dry eyes [ ] eye irritation [ ] postnasal drip [ ] nasal congestion  CV:                         [ ] negative  [ ] chest pain [ ] orthopnea [ ] palpitations [ ] murmur  Resp:                     [ ] negative [ ] cough [ ] shortness of breath [ ] dyspnea [ ] wheezing [ ] sputum [ ] hemoptysis  GI:                          [ ] negative [ ] nausea [ ] vomiting [ ] diarrhea [ ] constipation [ ] abd pain [ ] dysphagia   :                        [ ] negative [ ] dysuria [ ] nocturia [ ] hematuria [ ] increased urinary frequency  Musculoskeletal: [ ] negative [ ] back pain [ ] myalgias [ ] arthralgias [ ] fracture  Skin:                       [ ] negative [ ] rash [ ] itch  Neurological:        [ ] negative [ ] headache [ ] dizziness [ ] syncope [ ] weakness [ ] numbness  Psychiatric:           [ ] negative [ ] anxiety [ ] depression  Endocrine:            [ ] negative [ ] diabetes [ ] thyroid problem  Heme/Lymph:      [ ] negative [ ] anemia [ ] bleeding problem  Allergic/Immune: [ ] negative [ ] itchy eyes [ ] nasal discharge [ ] hives [ ] angioedema    [x] All other systems negative  [ ] Unable to assess ROS because ________.    MEDICATIONS:  MEDICATIONS  (STANDING):  aspirin enteric coated 81 milliGRAM(s) Oral daily  clopidogrel Tablet 75 milliGRAM(s) Oral daily  DAPTOmycin IVPB 500 milliGRAM(s) IV Intermittent every 24 hours  hydrALAZINE 10 milliGRAM(s) Oral every 8 hours  insulin glargine Injectable (LANTUS) 5 Unit(s) SubCutaneous at bedtime  insulin lispro (HumaLOG) corrective regimen sliding scale   SubCutaneous three times a day before meals  insulin lispro (HumaLOG) corrective regimen sliding scale   SubCutaneous at bedtime  simvastatin 10 milliGRAM(s) Oral at bedtime    MEDICATIONS  (PRN):    ALLERGIES:  Allergies  contrast media (iodine-based) (Short breath)    OBJECTIVE:  ICU Vital Signs Last 24 Hrs  T(C): 36.3 (18 Mar 2019 05:50), Max: 37.3 (17 Mar 2019 19:00)  T(F): 97.4 (18 Mar 2019 05:50), Max: 99.2 (17 Mar 2019 19:00)  HR: 78 (18 Mar 2019 06:33) (78 - 80)  BP: 118/69 (18 Mar 2019 06:33) (85/49 - 183/76)  BP(mean): 85 (18 Mar 2019 06:33) (58 - 116)  RR: 26 (18 Mar 2019 06:33) (16 - 26)  SpO2: 98% (18 Mar 2019 06:33) (96% - 99%)  Adult Advanced Hemodynamics Last 24 Hrs  CVP(mm Hg): --  CVP(cm H2O): --  CO: --  CI: --  PA: --  PA(mean): --  PCWP: --  SVR: --  SVRI: --  PVR: --  PVRI: --    CAPILLARY BLOOD GLUCOSE    POCT Blood Glucose.: 211 mg/dL (17 Mar 2019 20:37)  POCT Blood Glucose.: 222 mg/dL (17 Mar 2019 16:26)    CAPILLARY BLOOD GLUCOSE    POCT Blood Glucose.: 211 mg/dL (17 Mar 2019 20:37)    I&O's Summary    17 Mar 2019 07:01  -  18 Mar 2019 07:00  --------------------------------------------------------  IN: 480 mL / OUT: 800 mL / NET: -320 mL    Daily Height in cm: 167.64 (17 Mar 2019 14:13)    Daily Weight in k.9 (18 Mar 2019 04:00)    PHYSICAL EXAMINATION:  General: WN/WD NAD  HEENT: PERRLA, EOMI, moist mucous membranes  Neurology: A&Ox3, nonfocal, VERGARA x 4  Respiratory: CTA B/L, normal respiratory effort, no wheezes, crackles, rales  CV: RRR, S1S2, PPM extraction leads attached externally to temporary PPM   Abdominal: Soft, NT, ND +BS, Last BM  Extremities: No edema, + peripheral pulses  Incisions:   Tubes:    LABS:                          12.1   5.0   )-----------( 163      ( 18 Mar 2019 02:53 )             36.0     03-18    139  |  103  |  19  ----------------------------<  222<H>  4.2   |  21<L>  |  1.52<H>    Ca    9.1      18 Mar 2019 02:53  Phos  3.7     -  Mg     1.8         TPro  7.6  /  Alb  3.6  /  TBili  0.5  /  DBili  x   /  AST  19  /  ALT  23  /  AlkPhos  51  03-17    LIVER FUNCTIONS - ( 17 Mar 2019 14:56 )  Alb: 3.6 g/dL / Pro: 7.6 g/dL / ALK PHOS: 51 U/L / ALT: 23 U/L / AST: 19 U/L / GGT: x           PT/INR - ( 18 Mar 2019 02:53 )   PT: 13.4 sec;   INR: 1.17 ratio      PTT - ( 17 Mar 2019 14:56 )  PTT:27.3 sec    TELEMETRY: v paced, HR 89    EKG:     IMAGING:    - echo pending

## 2019-03-18 NOTE — PROGRESS NOTE ADULT - ASSESSMENT
68M with CHB vs. SSS s/p PPM (Medtronic), HTN, HLD, DM2, PVD with Bilateral LE Stents and GERD transferred from [facility] for lead extraction 2/2 infected pacemaker. Blood cultures negative, wound cx positive from PPM pocket site +coag negative staph, s/p vanc and zosyn 3/7-3/12, now on Daptomycin due to FRANCIS.     #Neuro  - no active issues    #C 68M with CHB vs. SSS s/p PPM (Medtronic), HTN, HLD, DM2, PVD with Bilateral LE Stents and GERD transferred from [facility] for lead extraction 2/2 infected pacemaker. Blood cultures negative, wound cx positive from PPM pocket site +coag negative staph, s/p vanc and zosyn 3/7-3/12, now on Daptomycin due to FRANCIS.     #Neuro  - no active issues  - Pt alert, oriented and responsive  #CV   - SSS s/p Medtronic PPM with battery exchange last year, now transferred here for lead extraction   - at [place] - attempted extraction, right IJ active fixation temporary pacemaker with externalized Medtronic generator placed, remains with 3 intravascular pacing leads  - plan for complete lead extraction  - TTE pending   - PVD with b/l LE - c/w asa and plavix   - HTN - c/w hydralazine   - HLD - c/w simvastatin       #Respiratory  - no active issues  - saturating well on RA, lungs CTAB    #GI  - NPO for lead extraction     #Renal  - FRANCIS from vanc at OSH, SCr now 1.52  - trend SCr, renally dose medications, avoid nephrotoxic agents    #Endo  - A1C 8.3  - c/w Lantus, SSI qac/hs on hold while NPO    #Heme  - no active issues  - Hb stable 12    #DVT ppx  - on hold for lead extraction 68M with CHB vs. SSS s/p PPM (Medtronic), HTN, HLD, DM2, PVD with Bilateral LE Stents and GERD transferred from [facility] for lead extraction 2/2 infected pacemaker. Blood cultures negative, wound cx positive from PPM pocket site +coag negative staph, s/p vanc and zosyn 3/7-3/12, now on Daptomycin due to FRANCIS. 68M with CHB vs. SSS s/p PPM (Medtronic), PVD with Bilateral LE Stents transferred from [facility] for lead extraction 2/2 infected pacemaker. Underwent attempted system extraction with Dr. Dhaliwal. Right IJ active fixation temporary pacemaker with externalized Medtronic generator was placed. At the time of attempted extraction, the generator was removed, but the remaining pocket leads were cut and retracted to the central circulation. Remains with multiple intravascular pacing leads. Currently, VVI paced at HR 80. Plan for complete lead extraction 3/19. Generator pocket with coag negative staph, was on vancomycin and zosyn, now on daptomycin due to FRANCIS.

## 2019-03-18 NOTE — PROGRESS NOTE ADULT - ASSESSMENT
68 year old male with Sick Sinus Syndrome requiring a pacemaker, had a pacemaker battery exchage about a year ago and noticed that the PPM was migrating through the skin. He was transferred from [facility] where the PPM was extracted, but the lead wires remained and attached to PPM near [facility]. During his hospitalization, he developed worsening renal function, and was changed from vancomycin to daptomycin. All blood cultures were negative, but a wound culture was positive for CoNS. He was continued on dapto and transferred to [facility] for lead extraction and reimplantation of another PPM. He currently feels well. Afebrile. WBC WNL. Recommend: Continue daptomycin 500 mG IV q 24 hours, check CPK weekly while on daptomycin, F/U blood cultures, f/u EP regarding explantation of lead wires. 68 year old male with Sick Sinus Syndrome requiring a pacemaker, had a pacemaker battery exchage about a year ago and noticed that the PPM was migrating through the skin. He was transferred from [facility] for PPM infection where the PPM was extracted, but the lead wires remained and attached to PPM near [facility]. During his hospitalization, he developed worsening renal function, and was changed from vancomycin to daptomycin. All blood cultures were negative, but a wound culture was positive for CoNS. He was continued on dapto and transferred to [facility] for lead extraction and reimplantation of another PPM. He currently feels well. Afebrile. WBC WNL. Recommend: Continue daptomycin 500 mG IV q 24 hours, check CPK weekly while on daptomycin, F/U blood cultures, f/u EP regarding explantation of lead wires.

## 2019-03-18 NOTE — PROGRESS NOTE ADULT - SUBJECTIVE AND OBJECTIVE BOX
CC: Patient is a 68y old  Male who presents with a chief complaint of Lead Extraction 2/2 Infected PPM (18 Mar 2019 09:22)    ID following for PPM infection    Interval History/ROS: Patient feeling better today. Has no complaints. Denies fever, chills.    Rest of ROS negative.    Allergies  contrast media (iodine-based) (Short breath)    ANTIMICROBIALS:  DAPTOmycin IVPB 500 every 24 hours    OTHER MEDS:  aspirin enteric coated 81 milliGRAM(s) Oral daily  clopidogrel Tablet 75 milliGRAM(s) Oral daily  docusate sodium 100 milliGRAM(s) Oral daily  hydrALAZINE 10 milliGRAM(s) Oral every 8 hours  insulin glargine Injectable (LANTUS) 5 Unit(s) SubCutaneous at bedtime  insulin lispro (HumaLOG) corrective regimen sliding scale   SubCutaneous three times a day before meals  insulin lispro (HumaLOG) corrective regimen sliding scale   SubCutaneous at bedtime  senna 1 Tablet(s) Oral at bedtime  simvastatin 10 milliGRAM(s) Oral at bedtime    PE:    Vital Signs Last 24 Hrs  T(C): 36.9 (18 Mar 2019 11:00), Max: 37.3 (17 Mar 2019 19:00)  T(F): 98.5 (18 Mar 2019 11:00), Max: 99.2 (17 Mar 2019 19:00)  HR: 78 (18 Mar 2019 12:00) (78 - 80)  BP: 106/76 (18 Mar 2019 12:00) (85/49 - 183/76)  BP(mean): 95 (18 Mar 2019 12:00) (58 - 116)  RR: 13 (18 Mar 2019 12:00) (13 - 26)  SpO2: 97% (18 Mar 2019 12:00) (96% - 99%)    Gen: AOx3, NAD, non-toxic  CV: S1+S2 normal, +murmur  Resp: Clear bilat, no resp distress  Abd: Soft, nontender, +BS  Ext: No LE edema, no wounds  : No CVA tenderness  IV/Skin: Left chest wound no purulence  Neuro: no focal deficits    LABS:                          12.1   5.0   )-----------( 163      ( 18 Mar 2019 02:53 )             36.0       03-18    139  |  103  |  19  ----------------------------<  222<H>  4.2   |  21<L>  |  1.52<H>    Ca    9.1      18 Mar 2019 02:53  Phos  3.7     03-18  Mg     1.8     03-18    TPro  7.6  /  Alb  3.6  /  TBili  0.5  /  DBili  x   /  AST  19  /  ALT  23  /  AlkPhos  51  03-17    MICROBIOLOGY:  v  RADIOLOGY:    < from: Xray Chest 1 View- PORTABLE-Urgent (03.17.19 @ 15:22) >  Impression:    The heart is normal in size. The lungs are clear. A temporary pacer was   placed and the tip of the electrode is in the right ventricular. The left   pacer was removed. The wires are still present with in the thorax    < end of copied text >

## 2019-03-19 LAB
ALBUMIN SERPL ELPH-MCNC: 3.5 G/DL — SIGNIFICANT CHANGE UP (ref 3.3–5)
ALP SERPL-CCNC: 50 U/L — SIGNIFICANT CHANGE UP (ref 40–120)
ALT FLD-CCNC: 18 U/L — SIGNIFICANT CHANGE UP (ref 10–45)
ANION GAP SERPL CALC-SCNC: 15 MMOL/L — SIGNIFICANT CHANGE UP (ref 5–17)
APTT BLD: 31.8 SEC — SIGNIFICANT CHANGE UP (ref 27.5–36.3)
AST SERPL-CCNC: 15 U/L — SIGNIFICANT CHANGE UP (ref 10–40)
BASOPHILS # BLD AUTO: 0 K/UL — SIGNIFICANT CHANGE UP (ref 0–0.2)
BASOPHILS NFR BLD AUTO: 0.7 % — SIGNIFICANT CHANGE UP (ref 0–2)
BILIRUB SERPL-MCNC: 0.4 MG/DL — SIGNIFICANT CHANGE UP (ref 0.2–1.2)
BUN SERPL-MCNC: 21 MG/DL — SIGNIFICANT CHANGE UP (ref 7–23)
CALCIUM SERPL-MCNC: 9.4 MG/DL — SIGNIFICANT CHANGE UP (ref 8.4–10.5)
CHLORIDE SERPL-SCNC: 104 MMOL/L — SIGNIFICANT CHANGE UP (ref 96–108)
CO2 SERPL-SCNC: 22 MMOL/L — SIGNIFICANT CHANGE UP (ref 22–31)
CREAT SERPL-MCNC: 1.58 MG/DL — HIGH (ref 0.5–1.3)
CULTURE RESULTS: SIGNIFICANT CHANGE UP
EOSINOPHIL # BLD AUTO: 0.4 K/UL — SIGNIFICANT CHANGE UP (ref 0–0.5)
EOSINOPHIL NFR BLD AUTO: 6.6 % — HIGH (ref 0–6)
GLUCOSE BLDC GLUCOMTR-MCNC: 175 MG/DL — HIGH (ref 70–99)
GLUCOSE BLDC GLUCOMTR-MCNC: 213 MG/DL — HIGH (ref 70–99)
GLUCOSE BLDC GLUCOMTR-MCNC: 219 MG/DL — HIGH (ref 70–99)
GLUCOSE BLDC GLUCOMTR-MCNC: 227 MG/DL — HIGH (ref 70–99)
GLUCOSE SERPL-MCNC: 216 MG/DL — HIGH (ref 70–99)
HCT VFR BLD CALC: 36.1 % — LOW (ref 39–50)
HGB BLD-MCNC: 12.2 G/DL — LOW (ref 13–17)
INR BLD: 1.14 RATIO — SIGNIFICANT CHANGE UP (ref 0.88–1.16)
LYMPHOCYTES # BLD AUTO: 0.8 K/UL — LOW (ref 1–3.3)
LYMPHOCYTES # BLD AUTO: 13.4 % — SIGNIFICANT CHANGE UP (ref 13–44)
MAGNESIUM SERPL-MCNC: 2 MG/DL — SIGNIFICANT CHANGE UP (ref 1.6–2.6)
MCHC RBC-ENTMCNC: 28.8 PG — SIGNIFICANT CHANGE UP (ref 27–34)
MCHC RBC-ENTMCNC: 33.7 GM/DL — SIGNIFICANT CHANGE UP (ref 32–36)
MCV RBC AUTO: 85.6 FL — SIGNIFICANT CHANGE UP (ref 80–100)
MONOCYTES # BLD AUTO: 0.8 K/UL — SIGNIFICANT CHANGE UP (ref 0–0.9)
MONOCYTES NFR BLD AUTO: 12.8 % — SIGNIFICANT CHANGE UP (ref 2–14)
NEUTROPHILS # BLD AUTO: 4.1 K/UL — SIGNIFICANT CHANGE UP (ref 1.8–7.4)
NEUTROPHILS NFR BLD AUTO: 66.6 % — SIGNIFICANT CHANGE UP (ref 43–77)
PHOSPHATE SERPL-MCNC: 3.6 MG/DL — SIGNIFICANT CHANGE UP (ref 2.5–4.5)
PLATELET # BLD AUTO: 181 K/UL — SIGNIFICANT CHANGE UP (ref 150–400)
POTASSIUM SERPL-MCNC: 3.9 MMOL/L — SIGNIFICANT CHANGE UP (ref 3.5–5.3)
POTASSIUM SERPL-SCNC: 3.9 MMOL/L — SIGNIFICANT CHANGE UP (ref 3.5–5.3)
PROT SERPL-MCNC: 6.8 G/DL — SIGNIFICANT CHANGE UP (ref 6–8.3)
PROTHROM AB SERPL-ACNC: 13.2 SEC — HIGH (ref 10–12.9)
RBC # BLD: 4.22 M/UL — SIGNIFICANT CHANGE UP (ref 4.2–5.8)
RBC # FLD: 14.4 % — SIGNIFICANT CHANGE UP (ref 10.3–14.5)
SODIUM SERPL-SCNC: 141 MMOL/L — SIGNIFICANT CHANGE UP (ref 135–145)
SPECIMEN SOURCE: SIGNIFICANT CHANGE UP
WBC # BLD: 6.1 K/UL — SIGNIFICANT CHANGE UP (ref 3.8–10.5)
WBC # FLD AUTO: 6.1 K/UL — SIGNIFICANT CHANGE UP (ref 3.8–10.5)

## 2019-03-19 PROCEDURE — 33235 REMOVAL PACEMAKER ELECTRODE: CPT | Mod: 59

## 2019-03-19 PROCEDURE — 93010 ELECTROCARDIOGRAM REPORT: CPT

## 2019-03-19 PROCEDURE — 71045 X-RAY EXAM CHEST 1 VIEW: CPT | Mod: 26

## 2019-03-19 PROCEDURE — 33274 TCAT INSJ/RPL PERM LDLS PM: CPT | Mod: Q0

## 2019-03-19 PROCEDURE — 99232 SBSQ HOSP IP/OBS MODERATE 35: CPT

## 2019-03-19 PROCEDURE — 99233 SBSQ HOSP IP/OBS HIGH 50: CPT | Mod: GC

## 2019-03-19 PROCEDURE — 33222 RELOCATION POCKET PACEMAKER: CPT | Mod: 59

## 2019-03-19 PROCEDURE — 99233 SBSQ HOSP IP/OBS HIGH 50: CPT

## 2019-03-19 RX ADMIN — Medication 2: at 18:15

## 2019-03-19 RX ADMIN — SIMVASTATIN 10 MILLIGRAM(S): 20 TABLET, FILM COATED ORAL at 21:01

## 2019-03-19 RX ADMIN — INSULIN GLARGINE 5 UNIT(S): 100 INJECTION, SOLUTION SUBCUTANEOUS at 21:18

## 2019-03-19 RX ADMIN — Medication 4: at 11:22

## 2019-03-19 RX ADMIN — Medication 10 MILLIGRAM(S): at 21:01

## 2019-03-19 RX ADMIN — DAPTOMYCIN 120 MILLIGRAM(S): 500 INJECTION, POWDER, LYOPHILIZED, FOR SOLUTION INTRAVENOUS at 05:21

## 2019-03-19 RX ADMIN — Medication 10 MILLIGRAM(S): at 05:21

## 2019-03-19 RX ADMIN — Medication 4: at 08:13

## 2019-03-19 NOTE — CHART NOTE - NSCHARTNOTEFT_GEN_A_CORE
====================  CCU MIDNIGHT ROUNDS  ====================    GUSTAVO LOVELL  [id number]    ====================  SUMMARY: HPI:  This is a 68 year old man with past medical history of Sick Sinus Syndrome with PPM (Medtronic), T2DM, HTN, HLD, PVD with Bilateral LE Stents and GERD transferred from [facility] for lead extraction 2/2 infected Pacemaker.  Patient states he had a battery change last year and his PPM migrated out through incision site.  Chart discussed erosion of generator and Coag-negative Staph infection of PPM pocket site and negative blood cultures, received Vanc and Zosyn from 3/7-3/12 and started on Daptomycin 500mg IV daily since 3/13.  Pacemaker extracted and leads attached to PPM near RIJ dressed sterilely.  During course of hospitalization patient developed FRANCIS slowly resolving, creatinine is 1.53 at [facility] (high 2.2 at [facility]).  Patient denies fever, chills, syncope, recent sick contact, SOB, chest pain, abdominal pain, N/V/D. (17 Mar 2019 15:50)    ====================        ====================  NEW EVENTS:  s/p micra PPM today, post op CXR without PTX  right groin site without hematoma; sutures to be removed in AM  ====================        ====================  VITALS (Last 12 hrs):  ====================    T(C): 36.7 (03-19-19 @ 19:00), Max: 37.1 (03-19-19 @ 11:00)  HR: 70 (03-19-19 @ 21:00) (68 - 78)  BP: 124/75 (03-19-19 @ 14:01) (119/72 - 128/78)  BP(mean): 100 (03-19-19 @ 14:01) (96 - 109)  ABP: 126/56 (03-19-19 @ 21:00) (126/56 - 144/60)  ABP(mean): 78 (03-19-19 @ 21:00) (78 - 86)  RR: 18 (03-19-19 @ 21:00) (17 - 30)  SpO2: 96% (03-19-19 @ 21:00) (95% - 96%)      TELEMETRY: paced        I&O's Summary    18 Mar 2019 07:01  -  19 Mar 2019 07:00  --------------------------------------------------------  IN: 1010 mL / OUT: 900 mL / NET: 110 mL    19 Mar 2019 07:01  -  19 Mar 2019 22:44  --------------------------------------------------------  IN: 50 mL / OUT: 600 mL / NET: -550 mL        ====================  PLAN:  # PPM infection  - s/p lead extraction with micra implant today  - post CXR without PTX  - EP to check micra tomorrow  - continue with wound vac  - c/w dapto per ID; f/u re: length of course  - check CPK weekly while on daptomycin  - Monitor FRANCIS, trend I/Os, BMP, lytes, avoid nephrotoxic agents  - TTE EF 70%, no wall motion abnormalities  - Cont DAPT, statin for b/l LE stents  - Cont hydralazine    ====================    HEALTH ISSUES - PROBLEM Dx:  Peripheral vascular disease  FRANCIS (acute kidney injury)  Hypertension  Type 2 diabetes mellitus  Infected pacemaker        HEALTH ISSUES - R/O PROBLEM Dx:  Peripheral vascular disease      Leticia Lynne, CCU PA #[id number] ====================  CCU MIDNIGHT ROUNDS  ====================    GUSTAVO LOVELL  [id number]    ====================  SUMMARY: HPI:  This is a 68 year old man with past medical history of Sick Sinus Syndrome with PPM (Medtronic), T2DM, HTN, HLD, PVD with Bilateral LE Stents and GERD transferred from [facility] for lead extraction 2/2 infected Pacemaker.  Patient states he had a battery change last year and his PPM migrated out through incision site.  Chart discussed erosion of generator and Coag-negative Staph infection of PPM pocket site and negative blood cultures, received Vanc and Zosyn from 3/7-3/12 and started on Daptomycin 500mg IV daily since 3/13.  Pacemaker extracted and leads attached to PPM near RIJ dressed sterilely.  During course of hospitalization patient developed FRANCIS slowly resolving, creatinine is 1.53 at [facility] (high 2.2 at [facility]).  Patient denies fever, chills, syncope, recent sick contact, SOB, chest pain, abdominal pain, N/V/D. (17 Mar 2019 15:50)    ====================        ====================  NEW EVENTS:  s/p micra PPM today, post op CXR without PTX  right groin site without hematoma; sutures to be removed in AM    Update:  Groin sutures removed approx 5AM. Upon suture removal, layer of skin noted to be exposed with visible adipose tissue exposed approx dime sized lesion. Will notify EP attending. Site cleaned and covered.   ====================        ====================  VITALS (Last 12 hrs):  ====================    T(C): 36.7 (03-19-19 @ 19:00), Max: 37.1 (03-19-19 @ 11:00)  HR: 70 (03-19-19 @ 21:00) (68 - 78)  BP: 124/75 (03-19-19 @ 14:01) (119/72 - 128/78)  BP(mean): 100 (03-19-19 @ 14:01) (96 - 109)  ABP: 126/56 (03-19-19 @ 21:00) (126/56 - 144/60)  ABP(mean): 78 (03-19-19 @ 21:00) (78 - 86)  RR: 18 (03-19-19 @ 21:00) (17 - 30)  SpO2: 96% (03-19-19 @ 21:00) (95% - 96%)      TELEMETRY: paced        I&O's Summary    18 Mar 2019 07:01  -  19 Mar 2019 07:00  --------------------------------------------------------  IN: 1010 mL / OUT: 900 mL / NET: 110 mL    19 Mar 2019 07:01  -  19 Mar 2019 22:44  --------------------------------------------------------  IN: 50 mL / OUT: 600 mL / NET: -550 mL        ====================  PLAN:  # PPM infection  - s/p lead extraction with micra implant today  - post CXR without PTX  - EP to check micra tomorrow  - continue with wound vac  - c/w dapto per ID; f/u re: length of course  - check CPK weekly while on daptomycin  - Monitor FRANCIS, trend I/Os, BMP, lytes, avoid nephrotoxic agents  - TTE EF 70%, no wall motion abnormalities  - Cont DAPT, statin for b/l LE stents  - Cont hydralazine    ====================    HEALTH ISSUES - PROBLEM Dx:  Peripheral vascular disease  FRANCIS (acute kidney injury)  Hypertension  Type 2 diabetes mellitus  Infected pacemaker        HEALTH ISSUES - R/O PROBLEM Dx:  Peripheral vascular disease      Leticia DeTommaso, CCU PA #[id number]

## 2019-03-19 NOTE — CHART NOTE - NSCHARTNOTEFT_GEN_A_CORE
BRIEF PROCEDURE NOTE    GUSTAVO LOVELL  [id number]    Pre-op Diagnosis: Infected PCM pocket, CHB    Post-op Diagnosis: same    Procedure: Pacemaker lead extraction/ leadless pacemaker placement/ wound vac placement    Electrophysiologist: Evita Zarco MD    Assistant: none    Anesthesia: GA    Access: RFV    Description:  6Fr RFV  Stiff wire to SVC, serial dilatation with 12/18Fr, 27Fr Micra sheath placed  Micra delivered to high RV septum, tested, released  Each abandoned lead snared with ablation catheter and  goose-neck snare  All leads removed  Semiperm wire removed from RIJ  Micra sheath removed and hemostasis with figure of 8 stitch    Wound Vac placed in open pocket on left chest    Complications: none    EBL: 25cc    Disposition: to CCU/Stable    Plan:  remove stitch in AM  continue Abs  CXR  ECG  Check Micra in AM  Wound care service  see how patient does with VVI pacing (consider right sided DDD and Micra removal if needed)

## 2019-03-19 NOTE — PROGRESS NOTE ADULT - SUBJECTIVE AND OBJECTIVE BOX
24H hour events: no acute events overnight, pt found resting comfortably in the chair     MEDICATIONS:  aspirin enteric coated 81 milliGRAM(s) Oral daily  clopidogrel Tablet 75 milliGRAM(s) Oral daily  hydrALAZINE 10 milliGRAM(s) Oral every 8 hours  DAPTOmycin IVPB 500 milliGRAM(s) IV Intermittent every 24 hours  docusate sodium 100 milliGRAM(s) Oral daily  senna 1 Tablet(s) Oral at bedtime  insulin glargine Injectable (LANTUS) 5 Unit(s) SubCutaneous at bedtime  insulin lispro (HumaLOG) corrective regimen sliding scale   SubCutaneous three times a day before meals  insulin lispro (HumaLOG) corrective regimen sliding scale   SubCutaneous at bedtime  simvastatin 10 milliGRAM(s) Oral at bedtime  benzocaine 15 mG/menthol 3.6 mG (Sugar-Free) Lozenge 1 Lozenge Oral every 8 hours PRN    REVIEW OF SYSTEMS:  General: denies fever and chills  Neurological: denies HA, Weakness  Cardiac:  denies chest pain, palpitations, lightheadedness   Respiratory: denies SOB, coughing, wheezing   Abdominal: denies abdominal pain/discomfort, N/V/D   Peripheral Neurovascular: denies numbness/tingling to LE     PHYSICAL EXAM:  T(C): 36.8 (03-19-19 @ 07:00), Max: 37 (03-18-19 @ 19:00)  HR: 78 (03-19-19 @ 09:00) (78 - 80)  BP: 128/79 (03-19-19 @ 09:00) (92/54 - 160/69)  RR: 17 (03-19-19 @ 09:00) (13 - 24)  SpO2: 96% (03-19-19 @ 09:00) (95% - 99%)    I&O's Summary  18 Mar 2019 07:01  -  19 Mar 2019 07:00  --------------------------------------------------------  IN: 1010 mL / OUT: 900 mL / NET: 110 mL        Appearance: Alert. NAD	  Cardiovascular: RRR, S1S2, no m/g/r  Respiratory: CTA B/L	  Psychiatry: A & O x 3, Mood & affect appropriate  Gastrointestinal:  Soft, NT. ND. +BS	  Skin: Left infraclavicular surgical site s/p PPM removal with DSD. R DSD intact connected to Externalized temporary pacing wire site  Neurologic: Non-focal  Extremities: No edema BLE  Vascular: Peripheral pulses palpable 2+ bilaterally      LABS:	 	    CBC Full  -  ( 19 Mar 2019 05:27 )  WBC Count : 6.1 K/uL  Hemoglobin : 12.2 g/dL  Hematocrit : 36.1 %  Platelet Count - Automated : 181 K/uL  Mean Cell Volume : 85.6 fl  Mean Cell Hemoglobin : 28.8 pg  Mean Cell Hemoglobin Concentration : 33.7 gm/dL  Auto Neutrophil # : 4.1 K/uL  Auto Lymphocyte # : 0.8 K/uL  Auto Monocyte # : 0.8 K/uL  Auto Eosinophil # : 0.4 K/uL  Auto Basophil # : 0.0 K/uL  Auto Neutrophil % : 66.6 %  Auto Lymphocyte % : 13.4 %  Auto Monocyte % : 12.8 %  Auto Eosinophil % : 6.6 %  Auto Basophil % : 0.7 %    03-19    141  |  104  |  21  ----------------------------<  216<H>  3.9   |  22  |  1.58<H>  03-18    138  |  102  |  22  ----------------------------<  294<H>  4.2   |  23  |  1.59<H>    Ca    9.4      19 Mar 2019 05:27  Ca    9.4      18 Mar 2019 12:28  Phos  3.6     03-19  Phos  3.6     03-18  Mg     2.0     03-19  Mg     2.2     03-18    TPro  6.8  /  Alb  3.5  /  TBili  0.4  /  DBili  x   /  AST  15  /  ALT  18  /  AlkPhos  50  03-19  TPro  7.6  /  Alb  3.6  /  TBili  0.5  /  DBili  x   /  AST  19  /  ALT  23  /  AlkPhos  51  03-17      proBNP:   Lipid Profile:   HgA1c:   TSH:       CARDIAC MARKERS:      Creatine Kinase, Serum: 63 U/L (03-18-19 @ 12:28)      TELEMETRY: Vpaced, occasional PVCs   	    ECG:    	    RADIOLOGY:  < from: TTE with Doppler (w/Cont) (03.18.19 @ 07:04) >  ------------------------------------------------------------------------  Dimensions:    Normal Values:  LA:     3.2    2.0 - 4.0 cm  Ao:     3.2    2.0 - 3.8 cm  SEPTUM: 1.0    0.6 - 1.2 cm  PWT:    0.9    0.6 - 1.1 cm  LVIDd:  4.13.0 - 5.6 cm  LVIDs:  2.5    1.8 - 4.0 cm  Derived variables:  LVMI: 64 g/m2  RWT: 0.43  EF (Visual Estimate): 70 %  Doppler Peak Velocity (m/sec): TV=2.5    < from: TTE with Doppler (w/Cont) (03.18.19 @ 07:04) >  Conclusions:  Endocardial visualization enhanced with intravenous  injection of Ultrasonic Enhancing Agent (Definity).  Normal LV wall motion and ejection fraction.  A device wire is noted in the right heart.    < end of copied text >    	  ASSESSMENT/PLAN: 24H hour events:   no acute events overnight, pt found resting comfortably in the chair     MEDICATIONS:  aspirin enteric coated 81 milliGRAM(s) Oral daily  clopidogrel Tablet 75 milliGRAM(s) Oral daily  hydrALAZINE 10 milliGRAM(s) Oral every 8 hours  DAPTOmycin IVPB 500 milliGRAM(s) IV Intermittent every 24 hours  docusate sodium 100 milliGRAM(s) Oral daily  senna 1 Tablet(s) Oral at bedtime  insulin glargine Injectable (LANTUS) 5 Unit(s) SubCutaneous at bedtime  insulin lispro (HumaLOG) corrective regimen sliding scale   SubCutaneous three times a day before meals  insulin lispro (HumaLOG) corrective regimen sliding scale   SubCutaneous at bedtime  simvastatin 10 milliGRAM(s) Oral at bedtime  benzocaine 15 mG/menthol 3.6 mG (Sugar-Free) Lozenge 1 Lozenge Oral every 8 hours PRN    REVIEW OF SYSTEMS:  General: denies fever and chills  Neurological: denies HA, Weakness  Cardiac:  denies chest pain, palpitations, lightheadedness   Respiratory: denies SOB, coughing, wheezing   Abdominal: denies abdominal pain/discomfort, N/V/D   Peripheral Neurovascular: denies numbness/tingling to LE     PHYSICAL EXAM:  T(C): 36.8 (03-19-19 @ 07:00), Max: 37 (03-18-19 @ 19:00)  HR: 78 (03-19-19 @ 09:00) (78 - 80)  BP: 128/79 (03-19-19 @ 09:00) (92/54 - 160/69)  RR: 17 (03-19-19 @ 09:00) (13 - 24)  SpO2: 96% (03-19-19 @ 09:00) (95% - 99%)    I&O's Summary  18 Mar 2019 07:01  -  19 Mar 2019 07:00  --------------------------------------------------------  IN: 1010 mL / OUT: 900 mL / NET: 110 mL        Appearance: Alert. NAD	  Cardiovascular: RRR, S1S2, no m/g/r  Respiratory: CTA B/L	  Psychiatry: A & O x 3, Mood & affect appropriate  Gastrointestinal:  Soft, NT. ND. +BS	  Skin: Left infraclavicular surgical site PPM extraction site with DSD; RIJ pacing lead with DSD intact connected to Externalized temporary pacing generator  Neurologic: Non-focal  Extremities: No edema BLE  Vascular: Peripheral pulses palpable 2+ bilaterally      LABS:	 	    CBC Full  -  ( 19 Mar 2019 05:27 )  WBC Count : 6.1 K/uL  Hemoglobin : 12.2 g/dL  Hematocrit : 36.1 %  Platelet Count - Automated : 181 K/uL  Mean Cell Volume : 85.6 fl  Mean Cell Hemoglobin : 28.8 pg  Mean Cell Hemoglobin Concentration : 33.7 gm/dL  Auto Neutrophil # : 4.1 K/uL  Auto Lymphocyte # : 0.8 K/uL  Auto Monocyte # : 0.8 K/uL  Auto Eosinophil # : 0.4 K/uL  Auto Basophil # : 0.0 K/uL  Auto Neutrophil % : 66.6 %  Auto Lymphocyte % : 13.4 %  Auto Monocyte % : 12.8 %  Auto Eosinophil % : 6.6 %  Auto Basophil % : 0.7 %    03-19    141  |  104  |  21  ----------------------------<  216<H>  3.9   |  22  |  1.58<H>  03-18    138  |  102  |  22  ----------------------------<  294<H>  4.2   |  23  |  1.59<H>    Ca    9.4      19 Mar 2019 05:27  Ca    9.4      18 Mar 2019 12:28  Phos  3.6     03-19  Phos  3.6     03-18  Mg     2.0     03-19  Mg     2.2     03-18    TPro  6.8  /  Alb  3.5  /  TBili  0.4  /  DBili  x   /  AST  15  /  ALT  18  /  AlkPhos  50  03-19  TPro  7.6  /  Alb  3.6  /  TBili  0.5  /  DBili  x   /  AST  19  /  ALT  23  /  AlkPhos  51  03-17      proBNP:   Lipid Profile:   HgA1c:   TSH:       CARDIAC MARKERS:      Creatine Kinase, Serum: 63 U/L (03-18-19 @ 12:28)      TELEMETRY: Vpaced, occasional PVCs   	    ECG:    	    RADIOLOGY:  < from: TTE with Doppler (w/Cont) (03.18.19 @ 07:04) >  ------------------------------------------------------------------------  Dimensions:    Normal Values:  LA:     3.2    2.0 - 4.0 cm  Ao:     3.2    2.0 - 3.8 cm  SEPTUM: 1.0    0.6 - 1.2 cm  PWT:    0.9    0.6 - 1.1 cm  LVIDd:  4.13.0 - 5.6 cm  LVIDs:  2.5    1.8 - 4.0 cm  Derived variables:  LVMI: 64 g/m2  RWT: 0.43  EF (Visual Estimate): 70 %  Doppler Peak Velocity (m/sec): TV=2.5    < from: TTE with Doppler (w/Cont) (03.18.19 @ 07:04) >  Conclusions:  Endocardial visualization enhanced with intravenous  injection of Ultrasonic Enhancing Agent (Definity).  Normal LV wall motion and ejection fraction.  A device wire is noted in the right heart.    < end of copied text >    	  ASSESSMENT/PLAN:

## 2019-03-19 NOTE — PROGRESS NOTE ADULT - NSICDXPROBLEM_GEN_ALL_CORE_FT
PROBLEM DIAGNOSES  Problem: Infected pacemaker  Assessment and Plan: - SSS s/p Medtronic PPM 1995 with battery exchange last year  - attempted extraction with removal of generator, remains with 3 intravascular pacing leads, right IJ active fixation temporary pacemaker with externalized Medtronic generator placed  - plan for complete lead extraction today   - TTE 3/18 without evidence of endocarditis   - PPM wound site with coag negative staph, s/p vanc and zosyn 3/7-3/12, now on Daptomycin due to FRANCIS (CK wnl, monitor weekly), BCx NTD      Problem: FRANCIS (acute kidney injury)  Assessment and Plan: - FRANCIS from vanc at OSH, current stable ~1.5  - trend SCr, renally dose medications, avoid nephrotoxic agents    Problem: Hypertension  Assessment and Plan: - c/w hydralazine 10mg TID    Problem: Peripheral vascular disease  Assessment and Plan: - s/p b/l LE stents   - c/w DAPT asa and plavix     Problem: Type 2 diabetes mellitus  Assessment and Plan: - A1C 8.3, c/w Lantus, SSI

## 2019-03-19 NOTE — PROGRESS NOTE ADULT - ASSESSMENT
68M with CHB vs. SSS s/p PPM (Medtronic), HTN, HLD, DM2, PVD with Bilateral LE Stents and GERD transferred from [facility] for lead extraction 2/2 infected pacemaker. Underwent attempted system extraction with Dr. Dhaliwal. Right IJ active fixation temporary pacemaker with externalized Medtronic generator was placed. At the time of attempted extraction, the generator was removed, but the remaining pocket leads were cut and retracted to the central circulation. Remains with 3 intravascular pacing leads. Currently, VVI paced at HR 80.    RA Lead:   Problem: PPM infection   -- NPO today for complete lead extraction today and pacemaker reimplant  -- Active Type and Screen (3/18)  -- TTE from 3/18 negative for endocarditis    -- Continue IV Daptomycin 500mg QD per ID Coag negative Staph on superficial wound culture. All peripheral blood cx from OSH negative.   -- Cont to monitor CPK while on Daptomycin    -- Cont to monitor BMP, especially FRANCIS. Keep K+> 4.0 and Mg++>2.0; Serum Cr trending down

## 2019-03-19 NOTE — PROGRESS NOTE ADULT - SUBJECTIVE AND OBJECTIVE BOX
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  Awaiting lead extraction      Allergies    contrast media (iodine-based) (Short breath)    Intolerances        ANTIMICROBIALS:  DAPTOmycin IVPB 500 every 24 hours      OTHER MEDS:  aspirin enteric coated 81 milliGRAM(s) Oral daily  benzocaine 15 mG/menthol 3.6 mG (Sugar-Free) Lozenge 1 Lozenge Oral every 8 hours PRN  clopidogrel Tablet 75 milliGRAM(s) Oral daily  docusate sodium 100 milliGRAM(s) Oral daily  hydrALAZINE 10 milliGRAM(s) Oral every 8 hours  insulin glargine Injectable (LANTUS) 5 Unit(s) SubCutaneous at bedtime  insulin lispro (HumaLOG) corrective regimen sliding scale   SubCutaneous three times a day before meals  insulin lispro (HumaLOG) corrective regimen sliding scale   SubCutaneous at bedtime  senna 1 Tablet(s) Oral at bedtime  simvastatin 10 milliGRAM(s) Oral at bedtime      Vital Signs Last 24 Hrs  T(C): 36.8 (19 Mar 2019 07:00), Max: 37 (18 Mar 2019 19:00)  T(F): 98.3 (19 Mar 2019 07:00), Max: 98.6 (18 Mar 2019 19:00)  HR: 78 (19 Mar 2019 09:00) (78 - 80)  BP: 128/79 (19 Mar 2019 09:00) (92/54 - 160/69)  BP(mean): 116 (19 Mar 2019 09:00) (72 - 116)  RR: 17 (19 Mar 2019 09:00) (13 - 24)  SpO2: 96% (19 Mar 2019 09:00) (95% - 99%)    PHYSICAL EXAM:  General: [x ] non-toxic  HEAD/EYES: [ ] PERRL [x ] white sclera [ ] icterus  ENT:  [ ] normal [x ] supple [ ] thrush [ ] pharyngeal exudate  Cardiovascular:   [ ] murmur [x ] normal [ ] PPM/AICD  Respiratory:  [x ] clear to ausculation bilaterally  GI:  [x ] soft, non-tender, normal bowel sounds  :  [ ] osborne [x ] no CVA tenderness   Musculoskeletal:  [ ] no synovitis  Neurologic:  [ x] non-focal exam   Skin:  [ x] no rash, packing to left chest wall wound  Lymph: [ ] no lymphadenopathy  Psychiatric:  [ ] appropriate affect [x ] alert & oriented  Lines:  [ x] no phlebitis [ ] central line                                12.2   6.1   )-----------( 181      ( 19 Mar 2019 05:27 )             36.1       03-19    141  |  104  |  21  ----------------------------<  216<H>  3.9   |  22  |  1.58<H>    Ca    9.4      19 Mar 2019 05:27  Phos  3.6     03-19  Mg     2.0     03-19    TPro  6.8  /  Alb  3.5  /  TBili  0.4  /  DBili  x   /  AST  15  /  ALT  18  /  AlkPhos  50  03-19          MICROBIOLOGY:Culture Results:   No growth to date. (03-18-19 @ 00:48)  Culture Results:   No growth to date. (03-17-19 @ 22:08)  Culture Results:   No growth to date. (03-17-19 @ 22:08)      RADIOLOGY:

## 2019-03-19 NOTE — PROGRESS NOTE ADULT - ASSESSMENT
68M with CHB vs. SSS s/p PPM (Medtronic), PVD with Bilateral LE Stents transferred from [facility] for lead extraction 2/2 infected pacemaker. Underwent attempted system extraction with Dr. Dhaliwal. At the time of attempted extraction, the generator was removed, but the remaining pocket leads were cut and retracted to the central circulation. Remains with multiple intravascular pacing leads. Right IJ active fixation temporary pacemaker with externalized Medtronic generator was placed. Currently, VVI paced at HR 80. Plan for complete lead extraction today. Generator pocket with coag negative staph, was on vancomycin and zosyn, now on daptomycin due to FRANCIS.

## 2019-03-19 NOTE — PROGRESS NOTE ADULT - ASSESSMENT
68 year old male with Sick Sinus Syndrome requiring a pacemaker, had a pacemaker battery exchage about a year ago and noticed that the PPM was migrating through the skin. He was transferred from [facility] for PPM infection where the PPM was extracted, but the lead wires remained and attached to PPM near [facility]. During his hospitalization, he developed worsening renal function, and was changed from vancomycin to daptomycin. All blood cultures were negative, but a wound culture was positive for CoNS. He was continued on dapto and transferred to [facility] for lead extraction and reimplantation of another PPM. He currently feels well. Afebrile. WBC WNL.    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  EP plans to place a micra    4) FRANCIS - monitor Cr

## 2019-03-19 NOTE — PROGRESS NOTE ADULT - SUBJECTIVE AND OBJECTIVE BOX
PATIENT:  GUSTAVO LOVELL  [id number]    CHIEF COMPLAINT:  Patient is a 68y old  Male who presents with a chief complaint of Lead Extraction 2/2 Infected PPM (18 Mar 2019 12:24)    INTERVAL HISTORY/OVERNIGHT EVENTS: No acute overnight events. Scheduled for complete lead extraction today and possible micra.     REVIEW OF SYSTEMS:    Constitutional:     [ ] negative [ ] fevers [ ] chills [ ] weight loss [ ] weight gain  HEENT:                  [ ] negative [ ] dry eyes [ ] eye irritation [ ] postnasal drip [ ] nasal congestion  CV:                         [ ] negative  [ ] chest pain [ ] orthopnea [ ] palpitations [ ] murmur  Resp:                     [ ] negative [ ] cough [ ] shortness of breath [ ] dyspnea [ ] wheezing [ ] sputum [ ] hemoptysis  GI:                          [ ] negative [ ] nausea [ ] vomiting [ ] diarrhea [ ] constipation [ ] abd pain [ ] dysphagia   :                        [ ] negative [ ] dysuria [ ] nocturia [ ] hematuria [ ] increased urinary frequency  Musculoskeletal: [ ] negative [ ] back pain [ ] myalgias [ ] arthralgias [ ] fracture  Skin:                       [ ] negative [ ] rash [ ] itch  Neurological:        [ ] negative [ ] headache [ ] dizziness [ ] syncope [ ] weakness [ ] numbness  Psychiatric:           [ ] negative [ ] anxiety [ ] depression  Endocrine:            [ ] negative [ ] diabetes [ ] thyroid problem  Heme/Lymph:      [ ] negative [ ] anemia [ ] bleeding problem  Allergic/Immune: [ ] negative [ ] itchy eyes [ ] nasal discharge [ ] hives [ ] angioedema    [x] All other systems negative  [ ] Unable to assess ROS because ________.    MEDICATIONS:  MEDICATIONS  (STANDING):  aspirin enteric coated 81 milliGRAM(s) Oral daily  clopidogrel Tablet 75 milliGRAM(s) Oral daily  DAPTOmycin IVPB 500 milliGRAM(s) IV Intermittent every 24 hours  docusate sodium 100 milliGRAM(s) Oral daily  hydrALAZINE 10 milliGRAM(s) Oral every 8 hours  insulin glargine Injectable (LANTUS) 5 Unit(s) SubCutaneous at bedtime  insulin lispro (HumaLOG) corrective regimen sliding scale   SubCutaneous three times a day before meals  insulin lispro (HumaLOG) corrective regimen sliding scale   SubCutaneous at bedtime  senna 1 Tablet(s) Oral at bedtime  simvastatin 10 milliGRAM(s) Oral at bedtime    MEDICATIONS  (PRN):  benzocaine 15 mG/menthol 3.6 mG (Sugar-Free) Lozenge 1 Lozenge Oral every 8 hours PRN Sore Throat      ALLERGIES:  Allergies    contrast media (iodine-based) (Short breath)    Intolerances    OBJECTIVE:  ICU Vital Signs Last 24 Hrs  T(C): 36.8 (19 Mar 2019 07:00), Max: 37 (18 Mar 2019 19:00)  T(F): 98.3 (19 Mar 2019 07:00), Max: 98.6 (18 Mar 2019 19:00)  HR: 78 (19 Mar 2019 07:00) (78 - 80)  BP: 109/74 (19 Mar 2019 07:00) (92/54 - 160/69)  BP(mean): 87 (19 Mar 2019 07:00) (72 - 116)  RR: 20 (19 Mar 2019 07:00) (13 - 24)  SpO2: 97% (19 Mar 2019 07:00) (96% - 99%)    POCT Blood Glucose.: 207 mg/dL (18 Mar 2019 21:36)  POCT Blood Glucose.: 255 mg/dL (18 Mar 2019 17:29)  POCT Blood Glucose.: 229 mg/dL (18 Mar 2019 12:18)  POCT Blood Glucose.: 211 mg/dL (18 Mar 2019 08:13)    CAPILLARY BLOOD GLUCOSE    POCT Blood Glucose.: 207 mg/dL (18 Mar 2019 21:36)    I&O's Summary    18 Mar 2019 07:01  -  19 Mar 2019 07:00  --------------------------------------------------------  IN: 1010 mL / OUT: 900 mL / NET: 110 mL    Daily     Daily Weight in k.6 (19 Mar 2019 06:17)    PHYSICAL EXAMINATION:  General: WN/WD NAD  HEENT: PERRLA, EOMI, moist mucous membranes  Neurology: A&Ox3, nonfocal, VERGARA x 4  Respiratory: CTA B/L, normal respiratory effort, no wheezes, crackles, rales  CV: RRR, S1S2, PPM extraction leads attached externally to temporary PPM   Abdominal: Soft, NT, ND +BS, Last BM  Extremities: No edema, + peripheral pulses  Skin: left chest wall ppm generator site dressing c/d/i      LABS:                          12.2   6.1   )-----------( 181      ( 19 Mar 2019 05:27 )             36.1         141  |  104  |  21  ----------------------------<  216<H>  3.9   |  22  |  1.58<H>    Ca    9.4      19 Mar 2019 05:27  Phos  3.6       Mg     2.0         TPro  6.8  /  Alb  3.5  /  TBili  0.4  /  DBili  x   /  AST  15  /  ALT  18  /  AlkPhos  50      LIVER FUNCTIONS - ( 19 Mar 2019 05:27 )  Alb: 3.5 g/dL / Pro: 6.8 g/dL / ALK PHOS: 50 U/L / ALT: 18 U/L / AST: 15 U/L / GGT: x           PT/INR - ( 19 Mar 2019 05:27 )   PT: 13.2 sec;   INR: 1.14 ratio         PTT - ( 19 Mar 2019 05:27 )  PTT:31.8 sec  Creatine Kinase, Serum: 63 U/L ( @ 12:28)    CARDIAC MARKERS ( 18 Mar 2019 12:28 )  x     / x     / 63 U/L / x     / x        TELEMETRY: V paced at 80    EKG: V paced     IMAGING: < from: Xray Chest 1 View- PORTABLE-Urgent (19 @ 15:22) >    Impression:    The heart is normal in size. The lungs are clear. A temporary pacer was   placed and the tip of the electrode is in the right ventricular. The left   pacer was removed. The wires are still present with in the thorax    < end of copied text >

## 2019-03-19 NOTE — PRE-ANESTHESIA EVALUATION ADULT - NSANTHOSAYNRD_GEN_A_CORE
No. NIGEL screening performed.  STOP BANG Legend: 0-2 = LOW Risk; 3-4 = INTERMEDIATE Risk; 5-8 = HIGH Risk

## 2019-03-20 VITALS
TEMPERATURE: 98 F | HEART RATE: 68 BPM | OXYGEN SATURATION: 95 % | DIASTOLIC BLOOD PRESSURE: 53 MMHG | SYSTOLIC BLOOD PRESSURE: 124 MMHG | RESPIRATION RATE: 24 BRPM

## 2019-03-20 DIAGNOSIS — T82.7XXA INFECTION AND INFLAMMATORY REACTION DUE TO OTHER CARDIAC AND VASCULAR DEVICES, IMPLANTS AND GRAFTS, INITIAL ENCOUNTER: ICD-10-CM

## 2019-03-20 DIAGNOSIS — T84.7XXA INFECTION AND INFLAMMATORY REACTION DUE TO OTHER INTERNAL ORTHOPEDIC PROSTHETIC DEVICES, IMPLANTS AND GRAFTS, INITIAL ENCOUNTER: ICD-10-CM

## 2019-03-20 DIAGNOSIS — E78.5 HYPERLIPIDEMIA, UNSPECIFIED: ICD-10-CM

## 2019-03-20 LAB
ALBUMIN SERPL ELPH-MCNC: 3.7 G/DL — SIGNIFICANT CHANGE UP (ref 3.3–5)
ALP SERPL-CCNC: 55 U/L — SIGNIFICANT CHANGE UP (ref 40–120)
ALT FLD-CCNC: 19 U/L — SIGNIFICANT CHANGE UP (ref 10–45)
ANION GAP SERPL CALC-SCNC: 18 MMOL/L — HIGH (ref 5–17)
APTT BLD: 29.6 SEC — SIGNIFICANT CHANGE UP (ref 27.5–36.3)
AST SERPL-CCNC: 19 U/L — SIGNIFICANT CHANGE UP (ref 10–40)
BASOPHILS # BLD AUTO: 0 K/UL — SIGNIFICANT CHANGE UP (ref 0–0.2)
BASOPHILS NFR BLD AUTO: 0.5 % — SIGNIFICANT CHANGE UP (ref 0–2)
BILIRUB SERPL-MCNC: 0.4 MG/DL — SIGNIFICANT CHANGE UP (ref 0.2–1.2)
BUN SERPL-MCNC: 20 MG/DL — SIGNIFICANT CHANGE UP (ref 7–23)
CALCIUM SERPL-MCNC: 8.9 MG/DL — SIGNIFICANT CHANGE UP (ref 8.4–10.5)
CHLORIDE SERPL-SCNC: 101 MMOL/L — SIGNIFICANT CHANGE UP (ref 96–108)
CO2 SERPL-SCNC: 19 MMOL/L — LOW (ref 22–31)
CREAT SERPL-MCNC: 1.51 MG/DL — HIGH (ref 0.5–1.3)
EOSINOPHIL # BLD AUTO: 0.2 K/UL — SIGNIFICANT CHANGE UP (ref 0–0.5)
EOSINOPHIL NFR BLD AUTO: 3.6 % — SIGNIFICANT CHANGE UP (ref 0–6)
GLUCOSE BLDC GLUCOMTR-MCNC: 182 MG/DL — HIGH (ref 70–99)
GLUCOSE BLDC GLUCOMTR-MCNC: 252 MG/DL — HIGH (ref 70–99)
GLUCOSE SERPL-MCNC: 195 MG/DL — HIGH (ref 70–99)
HCT VFR BLD CALC: 35.4 % — LOW (ref 39–50)
HGB BLD-MCNC: 11.9 G/DL — LOW (ref 13–17)
INR BLD: 1.15 RATIO — SIGNIFICANT CHANGE UP (ref 0.88–1.16)
LYMPHOCYTES # BLD AUTO: 0.4 K/UL — LOW (ref 1–3.3)
LYMPHOCYTES # BLD AUTO: 6.4 % — LOW (ref 13–44)
MAGNESIUM SERPL-MCNC: 2 MG/DL — SIGNIFICANT CHANGE UP (ref 1.6–2.6)
MCHC RBC-ENTMCNC: 28.7 PG — SIGNIFICANT CHANGE UP (ref 27–34)
MCHC RBC-ENTMCNC: 33.5 GM/DL — SIGNIFICANT CHANGE UP (ref 32–36)
MCV RBC AUTO: 85.8 FL — SIGNIFICANT CHANGE UP (ref 80–100)
MONOCYTES # BLD AUTO: 0.6 K/UL — SIGNIFICANT CHANGE UP (ref 0–0.9)
MONOCYTES NFR BLD AUTO: 9.1 % — SIGNIFICANT CHANGE UP (ref 2–14)
NEUTROPHILS # BLD AUTO: 5.1 K/UL — SIGNIFICANT CHANGE UP (ref 1.8–7.4)
NEUTROPHILS NFR BLD AUTO: 80.5 % — HIGH (ref 43–77)
PHOSPHATE SERPL-MCNC: 3.3 MG/DL — SIGNIFICANT CHANGE UP (ref 2.5–4.5)
PLATELET # BLD AUTO: 192 K/UL — SIGNIFICANT CHANGE UP (ref 150–400)
POTASSIUM SERPL-MCNC: 4.1 MMOL/L — SIGNIFICANT CHANGE UP (ref 3.5–5.3)
POTASSIUM SERPL-SCNC: 4.1 MMOL/L — SIGNIFICANT CHANGE UP (ref 3.5–5.3)
PROT SERPL-MCNC: 6.8 G/DL — SIGNIFICANT CHANGE UP (ref 6–8.3)
PROTHROM AB SERPL-ACNC: 13.2 SEC — HIGH (ref 10–12.9)
RBC # BLD: 4.12 M/UL — LOW (ref 4.2–5.8)
RBC # FLD: 14.4 % — SIGNIFICANT CHANGE UP (ref 10.3–14.5)
SODIUM SERPL-SCNC: 138 MMOL/L — SIGNIFICANT CHANGE UP (ref 135–145)
WBC # BLD: 6.4 K/UL — SIGNIFICANT CHANGE UP (ref 3.8–10.5)
WBC # FLD AUTO: 6.4 K/UL — SIGNIFICANT CHANGE UP (ref 3.8–10.5)

## 2019-03-20 PROCEDURE — 80053 COMPREHEN METABOLIC PANEL: CPT

## 2019-03-20 PROCEDURE — P9016: CPT

## 2019-03-20 PROCEDURE — 93005 ELECTROCARDIOGRAM TRACING: CPT

## 2019-03-20 PROCEDURE — 86900 BLOOD TYPING SEROLOGIC ABO: CPT

## 2019-03-20 PROCEDURE — 87040 BLOOD CULTURE FOR BACTERIA: CPT

## 2019-03-20 PROCEDURE — 99233 SBSQ HOSP IP/OBS HIGH 50: CPT | Mod: GC

## 2019-03-20 PROCEDURE — 85027 COMPLETE CBC AUTOMATED: CPT

## 2019-03-20 PROCEDURE — 85610 PROTHROMBIN TIME: CPT

## 2019-03-20 PROCEDURE — C1733: CPT

## 2019-03-20 PROCEDURE — 86901 BLOOD TYPING SEROLOGIC RH(D): CPT

## 2019-03-20 PROCEDURE — C1773: CPT

## 2019-03-20 PROCEDURE — 84100 ASSAY OF PHOSPHORUS: CPT

## 2019-03-20 PROCEDURE — 99233 SBSQ HOSP IP/OBS HIGH 50: CPT

## 2019-03-20 PROCEDURE — 71045 X-RAY EXAM CHEST 1 VIEW: CPT

## 2019-03-20 PROCEDURE — 71045 X-RAY EXAM CHEST 1 VIEW: CPT | Mod: 26

## 2019-03-20 PROCEDURE — C8929: CPT

## 2019-03-20 PROCEDURE — 83036 HEMOGLOBIN GLYCOSYLATED A1C: CPT

## 2019-03-20 PROCEDURE — 87070 CULTURE OTHR SPECIMN AEROBIC: CPT

## 2019-03-20 PROCEDURE — 86803 HEPATITIS C AB TEST: CPT

## 2019-03-20 PROCEDURE — 82962 GLUCOSE BLOOD TEST: CPT

## 2019-03-20 PROCEDURE — 80048 BASIC METABOLIC PNL TOTAL CA: CPT

## 2019-03-20 PROCEDURE — 85730 THROMBOPLASTIN TIME PARTIAL: CPT

## 2019-03-20 PROCEDURE — C1786: CPT

## 2019-03-20 PROCEDURE — P9047: CPT

## 2019-03-20 PROCEDURE — 36569 INSJ PICC 5 YR+ W/O IMAGING: CPT

## 2019-03-20 PROCEDURE — 93010 ELECTROCARDIOGRAM REPORT: CPT

## 2019-03-20 PROCEDURE — 99232 SBSQ HOSP IP/OBS MODERATE 35: CPT

## 2019-03-20 PROCEDURE — C1751: CPT

## 2019-03-20 PROCEDURE — C1889: CPT

## 2019-03-20 PROCEDURE — 83735 ASSAY OF MAGNESIUM: CPT

## 2019-03-20 PROCEDURE — 82550 ASSAY OF CK (CPK): CPT

## 2019-03-20 PROCEDURE — C1894: CPT

## 2019-03-20 PROCEDURE — 76000 FLUOROSCOPY <1 HR PHYS/QHP: CPT

## 2019-03-20 PROCEDURE — 86923 COMPATIBILITY TEST ELECTRIC: CPT

## 2019-03-20 PROCEDURE — C1769: CPT

## 2019-03-20 PROCEDURE — 86850 RBC ANTIBODY SCREEN: CPT

## 2019-03-20 RX ORDER — DAPTOMYCIN 500 MG/10ML
500 INJECTION, POWDER, LYOPHILIZED, FOR SOLUTION INTRAVENOUS
Qty: 6.5 | Refills: 0 | OUTPATIENT
Start: 2019-03-20 | End: 2019-04-01

## 2019-03-20 RX ORDER — DAPTOMYCIN 500 MG/10ML
500 INJECTION, POWDER, LYOPHILIZED, FOR SOLUTION INTRAVENOUS
Qty: 0 | Refills: 0 | COMMUNITY
Start: 2019-03-20

## 2019-03-20 RX ORDER — METOPROLOL TARTRATE 50 MG
1.5 TABLET ORAL
Qty: 0 | Refills: 0 | COMMUNITY

## 2019-03-20 RX ORDER — HYDRALAZINE HCL 50 MG
1 TABLET ORAL
Qty: 90 | Refills: 0 | OUTPATIENT
Start: 2019-03-20 | End: 2019-04-18

## 2019-03-20 RX ORDER — METOPROLOL TARTRATE 50 MG
1 TABLET ORAL
Qty: 0 | Refills: 0 | COMMUNITY
Start: 2019-03-20

## 2019-03-20 RX ORDER — METOPROLOL TARTRATE 50 MG
50 TABLET ORAL DAILY
Qty: 0 | Refills: 0 | Status: DISCONTINUED | OUTPATIENT
Start: 2019-03-20 | End: 2019-03-20

## 2019-03-20 RX ORDER — AMLODIPINE BESYLATE 2.5 MG/1
10 TABLET ORAL DAILY
Qty: 0 | Refills: 0 | Status: DISCONTINUED | OUTPATIENT
Start: 2019-03-20 | End: 2019-03-20

## 2019-03-20 RX ADMIN — CLOPIDOGREL BISULFATE 75 MILLIGRAM(S): 75 TABLET, FILM COATED ORAL at 07:45

## 2019-03-20 RX ADMIN — Medication 10 MILLIGRAM(S): at 05:43

## 2019-03-20 RX ADMIN — Medication 10 MILLIGRAM(S): at 14:53

## 2019-03-20 RX ADMIN — Medication 100 MILLIGRAM(S): at 07:45

## 2019-03-20 RX ADMIN — DAPTOMYCIN 120 MILLIGRAM(S): 500 INJECTION, POWDER, LYOPHILIZED, FOR SOLUTION INTRAVENOUS at 05:43

## 2019-03-20 RX ADMIN — Medication 2: at 07:45

## 2019-03-20 RX ADMIN — Medication 81 MILLIGRAM(S): at 07:45

## 2019-03-20 RX ADMIN — Medication 6: at 12:15

## 2019-03-20 RX ADMIN — AMLODIPINE BESYLATE 10 MILLIGRAM(S): 2.5 TABLET ORAL at 05:42

## 2019-03-20 RX ADMIN — Medication 50 MILLIGRAM(S): at 05:42

## 2019-03-20 NOTE — DIETITIAN INITIAL EVALUATION ADULT. - FACTORS AFF FOOD INTAKE
Pt reporting taste changes and distaste for hospital food.  States that he eats mostly peanut butter crackers (noted at bedside), however, planning for d/c today and excited to eat food from home.  Noted Pt currently ordered for consistent carbohydrate + renal diet.  K+ and Phos WDL - possible that renal diet restrictions impacted intake.

## 2019-03-20 NOTE — PROGRESS NOTE ADULT - ASSESSMENT
68M with CHB vs. SSS s/p PPM (Medtronic), HTN, HLD, DM2, PVD with Bilateral LE Stents and GERD transferred from [facility] [place] [facility] for lead extraction 2/2 infected pacemaker. Underwent attempted system extraction with Dr. Dhaliwal. Right IJ active fixation temporary pacemaker with externalized Medtronic generator was placed. At the time of attempted extraction, the generator was removed, but the remaining pocket leads were cut and retracted to the central circulation. Remains with 3 intravascular pacing leads. Now s/p successful leads extraction with leadless pacemaker re-implant 3/19.     Problem: PPM infection   --s/p successful leads extraction with leadless pacemaker re-implant 3/19; pt tolerated procedure well  --Left chest wall wound site with wound vac in place  --Post-op device interrogated by MDT rep this morning; normal function. Pacing mode reprogramed with rate response on (current mode: VVIR at  bpm).   --c/w abx per ID rec  --Waiting PICC line placement   --Post procedure restrictions enforced with pt and his girl friend (Eleanor) at bedside  --See how patient does with VVI pacing, consider R sided dual chamber PCM and micra removal if needed  --Patient to follow up with his EP attending at [facility]  --Anticipate discharge planning today    [id number]

## 2019-03-20 NOTE — DISCHARGE NOTE NURSING/CASE MANAGEMENT/SOCIAL WORK - NSSCTYPOFSERV_GEN_ALL_CORE
visiting nurse for IV antibiotic treatment visiting nurse for IV antibiotic treatment will come to the home tomorrow (3/21) to deliver the medication and provide teaching

## 2019-03-20 NOTE — DISCHARGE NOTE NURSING/CASE MANAGEMENT/SOCIAL WORK - NSDCDPATPORTLINK_GEN_ALL_CORE
You can access the [facility] Patient Portal, offered by [facility], by registering with the following website: [email or web address]

## 2019-03-20 NOTE — DIETITIAN INITIAL EVALUATION ADULT. - PHYSICAL APPEARANCE
Pt appears overweight - no overt signs of wasting noted on limited visual exam.  Pt states usual body weight is 194 lbs.  Noted dosing weight 184 lbs.  Pt denies weight loss PTA, but believes he has lost weight during hospitalization (?scale discrepancy or weight loss as Pt is transfer).

## 2019-03-20 NOTE — DIETITIAN INITIAL EVALUATION ADULT. - ORAL INTAKE PTA
good/Pt reports good appetite and intake PTA; eating 2-3 meals per day; wife prepares dinner.  Pt confirms NKFA.  Taking B12 and vitamin D supplements PTA.

## 2019-03-20 NOTE — DIETITIAN INITIAL EVALUATION ADULT. - NS AS NUTRI INTERV MEALS SNACK
Recommend DASH, consistent carbohydrate with evening snack.  (D/c renal diet in context of K+, Phos WDL.)

## 2019-03-20 NOTE — DIETITIAN INITIAL EVALUATION ADULT. - NS AS NUTRI INTERV ED CONTENT
Pt and wife excited to be discharged today and state they have made changes to diet.  RD reinforced diet education - encouraging intake of vegetables, reduced intake of sodium/saturated fat.  Also encouraged consistent carbohydrate intake pattern for improved BG control in context of elevated HgbA1c and for incision healing.  Pt amenable to receiving handouts: T2Dm Nutrition Therapy and Heart Healthy Low Na Nutrition Therapy.  Pt and wife made aware RD remains available PRN.

## 2019-03-20 NOTE — DIETITIAN INITIAL EVALUATION ADULT. - ENERGY NEEDS
ht: 66 inches, weight: 184 lbs, BMI: 29.7 kg/m2, IBW: 142 lbs (+/- 10%), %IBW: 130%  Edema: none noted  Skin per nursing documentation: surgical incision; no pressure injuries  GI: last BM 3/19/19 per flowhseets  Per chart "68M with CHB vs. SSS s/p PPM (Medtronic), PVD with Bilateral LE Stents transferred from [facility] for lead extraction 2/2 infected pacemaker. s/p complete lead extraction today. Generator pocket with coag negative staph, was on vancomycin and zosyn, now on daptomycin due to FRANCIS. "

## 2019-03-20 NOTE — PROGRESS NOTE ADULT - ATTENDING COMMENTS
Rolando Mendez MD  Pager [phone]  After 5pm/weekends call [phone]
Rolando Mendez MD  Pager [phone]  After 5pm/weekends call [phone]
Patient is seen and examined with fellow, NP and the CCU house-staff. I agree with the history, physical and the assessment and plan.  awaiting lead extraction with possible MICRA inplant  c/w abx  f/u with ID

## 2019-03-20 NOTE — PROGRESS NOTE ADULT - SUBJECTIVE AND OBJECTIVE BOX
CC: Patient is a 68y old  Male who presents with a chief complaint of Lead Extraction 2/2 Infected PPM (20 Mar 2019 11:31)    ID following for PPM infection    Interval History/ROS: Patient s/p PPM lead extraction and new MicraPPM placement. Remains with wound vac. No fevers, no chills.    Rest of ROS negative.    Allergies  contrast media (iodine-based) (Short breath)    ANTIMICROBIALS:  DAPTOmycin IVPB 500 every 24 hours    OTHER MEDS:  amLODIPine   Tablet 10 milliGRAM(s) Oral daily  aspirin enteric coated 81 milliGRAM(s) Oral daily  benzocaine 15 mG/menthol 3.6 mG (Sugar-Free) Lozenge 1 Lozenge Oral every 8 hours PRN  clopidogrel Tablet 75 milliGRAM(s) Oral daily  docusate sodium 100 milliGRAM(s) Oral daily  hydrALAZINE 10 milliGRAM(s) Oral every 8 hours  insulin glargine Injectable (LANTUS) 5 Unit(s) SubCutaneous at bedtime  insulin lispro (HumaLOG) corrective regimen sliding scale   SubCutaneous three times a day before meals  insulin lispro (HumaLOG) corrective regimen sliding scale   SubCutaneous at bedtime  metoprolol succinate ER 50 milliGRAM(s) Oral daily  senna 1 Tablet(s) Oral at bedtime  simvastatin 10 milliGRAM(s) Oral at bedtime    PE:    Vital Signs Last 24 Hrs  T(C): 37 (20 Mar 2019 07:00), Max: 37.1 (19 Mar 2019 14:01)  T(F): 98.6 (20 Mar 2019 07:00), Max: 98.6 (20 Mar 2019 07:00)  HR: 68 (20 Mar 2019 12:00) (68 - 78)  BP: 121/58 (20 Mar 2019 12:00) (76/52 - 166/62)  BP(mean): 85 (20 Mar 2019 12:00) (65 - 110)  RR: 24 (20 Mar 2019 12:00) (18 - 30)  SpO2: 96% (20 Mar 2019 12:00) (92% - 97%)    Gen: AOx3, NAD, non-toxic  CV: S1+S2 normal, no murmurs  Resp: Clear bilat, no resp distress  Abd: Soft, nontender, +BS  Ext: No LE edema, no wounds  : No Cardenas  IV/Skin: No thrombophlebitis, wound vac to left chest  Neuro: no focal deficits    LABS:                          11.9   6.4   )-----------( 192      ( 20 Mar 2019 05:33 )             35.4       03-20    138  |  101  |  20  ----------------------------<  195<H>  4.1   |  19<L>  |  1.51<H>    Ca    8.9      20 Mar 2019 05:33  Phos  3.3     03-20  Mg     2.0     03-20    TPro  6.8  /  Alb  3.7  /  TBili  0.4  /  DBili  x   /  AST  19  /  ALT  19  /  AlkPhos  55  03-20    MICROBIOLOGY:  v  Skin LSC Wound  03-18-19   No growth at 48 hours  --  --      .Blood Blood  03-17-19   No growth to date.  --  --    RADIOLOGY:    < from: Xray Chest 1 View- PORTABLE-Urgent (03.19.19 @ 18:59) >  Impression:    The heart is normal in size. The lungs are clear. The pacemaker wires   were removed. No pneumothorax. Degenerative changes of the thoracic spine.    < end of copied text >

## 2019-03-20 NOTE — DIETITIAN INITIAL EVALUATION ADULT. - SIGNS/SYMPTOMS
sodium and saturated fat intake, HgbA1c 8.3% Pt report / noted Pt NPO for procedures during admission

## 2019-03-20 NOTE — PROGRESS NOTE ADULT - SUBJECTIVE AND OBJECTIVE BOX
24H hour events: No acute events overnight, pt without complaints, s/p PCM extraction and micra re-implant yesterday.      MEDICATIONS:  amLODIPine   Tablet 10 milliGRAM(s) Oral daily  aspirin enteric coated 81 milliGRAM(s) Oral daily  clopidogrel Tablet 75 milliGRAM(s) Oral daily  hydrALAZINE 10 milliGRAM(s) Oral every 8 hours  metoprolol succinate ER 50 milliGRAM(s) Oral daily    DAPTOmycin IVPB 500 milliGRAM(s) IV Intermittent every 24 hours    docusate sodium 100 milliGRAM(s) Oral daily  senna 1 Tablet(s) Oral at bedtime    insulin glargine Injectable (LANTUS) 5 Unit(s) SubCutaneous at bedtime  insulin lispro (HumaLOG) corrective regimen sliding scale   SubCutaneous three times a day before meals  insulin lispro (HumaLOG) corrective regimen sliding scale   SubCutaneous at bedtime  simvastatin 10 milliGRAM(s) Oral at bedtime    benzocaine 15 mG/menthol 3.6 mG (Sugar-Free) Lozenge 1 Lozenge Oral every 8 hours PRN      REVIEW OF SYSTEMS:  Complete 10point ROS negative.    PHYSICAL EXAM:  T(C): 37 (03-20-19 @ 07:00), Max: 37.1 (03-19-19 @ 14:01)  HR: 68 (03-20-19 @ 10:00) (68 - 78)  BP: 131/66 (03-20-19 @ 10:00) (76/52 - 166/62)  RR: 22 (03-20-19 @ 10:00) (18 - 30)  SpO2: 94% (03-20-19 @ 10:00) (92% - 97%)  I&O's Summary    19 Mar 2019 07:01  -  20 Mar 2019 07:00  --------------------------------------------------------  IN: 280 mL / OUT: 1325 mL / NET: -1045 mL    20 Mar 2019 07:01  -  20 Mar 2019 11:11  --------------------------------------------------------  IN: 0 mL / OUT: 400 mL / NET: -400 mL      Appearance: NAD	  HEENT:   Normal oral mucosa, PERRL, EOMI	  Lymphatic: No lymphadenopathy  Cardiovascular: Normal S1 S2, No JVD, No murmurs  Respiratory: Lungs clear to auscultation	  Psychiatry: A & O x 3, Mood & affect appropriate  Gastrointestinal:  Soft, Non-tender, + BS	  Skin: Left chest wall with wound vac in placement, C/D/I  Neurologic: Non-focal  Extremities: Normal range of motion, No clubbing, cyanosis or edema. Right groin surgical incision C/D/I without hematoma.  Vascular: Peripheral pulses palpable 2+ bilaterally      LABS:	 	    CBC Full  -  ( 20 Mar 2019 05:33 )  WBC Count : 6.4 K/uL  Hemoglobin : 11.9 g/dL  Hematocrit : 35.4 %  Platelet Count - Automated : 192 K/uL  Mean Cell Volume : 85.8 fl  Mean Cell Hemoglobin : 28.7 pg  Mean Cell Hemoglobin Concentration : 33.5 gm/dL  Auto Neutrophil # : 5.1 K/uL  Auto Lymphocyte # : 0.4 K/uL  Auto Monocyte # : 0.6 K/uL  Auto Eosinophil # : 0.2 K/uL  Auto Basophil # : 0.0 K/uL  Auto Neutrophil % : 80.5 %  Auto Lymphocyte % : 6.4 %  Auto Monocyte % : 9.1 %  Auto Eosinophil % : 3.6 %  Auto Basophil % : 0.5 %    03-20    138  |  101  |  20  ----------------------------<  195<H>  4.1   |  19<L>  |  1.51<H>  03-19    141  |  104  |  21  ----------------------------<  216<H>  3.9   |  22  |  1.58<H>    Ca    8.9      20 Mar 2019 05:33  Ca    9.4      19 Mar 2019 05:27  Phos  3.3     03-20  Phos  3.6     03-19  Mg     2.0     03-20  Mg     2.0     03-19    TPro  6.8  /  Alb  3.7  /  TBili  0.4  /  DBili  x   /  AST  19  /  ALT  19  /  AlkPhos  55  03-20  TPro  6.8  /  Alb  3.5  /  TBili  0.4  /  DBili  x   /  AST  15  /  ALT  18  /  AlkPhos  50  03-19      TELEMETRY: V paced at 70's   	    < from: Xray Chest 1 View- PORTABLE-Urgent (03.19.19 @ 18:59) >  Impression:    The heart is normal in size. The lungs are clear. The pacemaker wires   were removed. No pneumothorax. Degenerative changes of the thoracic spine.

## 2019-03-20 NOTE — DIETITIAN INITIAL EVALUATION ADULT. - ADHERENCE
Pt reports adherence to T2DM diet - reports he does not drink soda or juice and FS typically are 120 in the morning and 95 in the evening.  Noted HgbA1c 8.3% on 3/17/19 - Pt's wife states that it was previously around 7.8% and before dietary changes was 10/11.

## 2019-03-20 NOTE — DIETITIAN INITIAL EVALUATION ADULT. - NS AS NUTRI INTERV MEALS SNACK3
Recommend DASH, consistent carbohydrate with evening snack.  (D/c renal diet in context of K+, Phos WDL.)  Encourage po intake.

## 2019-03-20 NOTE — DISCHARGE NOTE PROVIDER - NSDCCPCAREPLAN_GEN_ALL_CORE_FT
PRINCIPAL DISCHARGE DIAGNOSIS  Diagnosis: Infected pacemaker  Assessment and Plan of Treatment: Please follow up with your cardiologist regarding your Micra pacemaker. Please complete your antibiotics for a total of two weeks through 4/2

## 2019-03-20 NOTE — PROGRESS NOTE ADULT - SUBJECTIVE AND OBJECTIVE BOX
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 s/p complete lead extraction and micra placement yesterday. Wound vac placed on previous generator site. Pt. feels well, denies chest pain, SOB, fevers, would like to go home.     REVIEW OF SYSTEMS:    Constitutional:     [ ] negative [ ] fevers [ ] chills [ ] weight loss [ ] weight gain  HEENT:                  [ ] negative [ ] dry eyes [ ] eye irritation [ ] postnasal drip [ ] nasal congestion  CV:                         [ ] negative  [ ] chest pain [ ] orthopnea [ ] palpitations [ ] murmur  Resp:                     [ ] negative [ ] cough [ ] shortness of breath [ ] dyspnea [ ] wheezing [ ] sputum [ ] hemoptysis  GI:                          [ ] negative [ ] nausea [ ] vomiting [ ] diarrhea [ ] constipation [ ] abd pain [ ] dysphagia   :                        [ ] negative [ ] dysuria [ ] nocturia [ ] hematuria [ ] increased urinary frequency  Musculoskeletal: [ ] negative [ ] back pain [ ] myalgias [ ] arthralgias [ ] fracture  Skin:                       [ ] negative [ ] rash [ ] itch  Neurological:        [ ] negative [ ] headache [ ] dizziness [ ] syncope [ ] weakness [ ] numbness  Psychiatric:           [ ] negative [ ] anxiety [ ] depression  Endocrine:            [ ] negative [ ] diabetes [ ] thyroid problem  Heme/Lymph:      [ ] negative [ ] anemia [ ] bleeding problem  Allergic/Immune: [ ] negative [ ] itchy eyes [ ] nasal discharge [ ] hives [ ] angioedema    [x] All other systems negative  [ ] Unable to assess ROS because ________.    MEDICATIONS:  MEDICATIONS  (STANDING):  amLODIPine   Tablet 10 milliGRAM(s) Oral daily  aspirin enteric coated 81 milliGRAM(s) Oral daily  clopidogrel Tablet 75 milliGRAM(s) Oral daily  DAPTOmycin IVPB 500 milliGRAM(s) IV Intermittent every 24 hours  docusate sodium 100 milliGRAM(s) Oral daily  hydrALAZINE 10 milliGRAM(s) Oral every 8 hours  insulin glargine Injectable (LANTUS) 5 Unit(s) SubCutaneous at bedtime  insulin lispro (HumaLOG) corrective regimen sliding scale   SubCutaneous three times a day before meals  insulin lispro (HumaLOG) corrective regimen sliding scale   SubCutaneous at bedtime  metoprolol succinate ER 50 milliGRAM(s) Oral daily  senna 1 Tablet(s) Oral at bedtime  simvastatin 10 milliGRAM(s) Oral at bedtime    MEDICATIONS  (PRN):  benzocaine 15 mG/menthol 3.6 mG (Sugar-Free) Lozenge 1 Lozenge Oral every 8 hours PRN Sore Throat    ALLERGIES:  Allergies    contrast media (iodine-based) (Short breath)    OBJECTIVE:  ICU Vital Signs Last 24 Hrs  T(C): 37 (20 Mar 2019 07:00), Max: 37.1 (19 Mar 2019 11:00)  T(F): 98.6 (20 Mar 2019 07:00), Max: 98.7 (19 Mar 2019 11:00)  HR: 68 (20 Mar 2019 07:00) (68 - 78)  BP: 166/62 (20 Mar 2019 07:00) (114/67 - 166/62)  BP(mean): 89 (20 Mar 2019 07:00) (89 - 116)  ABP: 142/64 (20 Mar 2019 05:00) (126/56 - 154/66)  ABP(mean): 88 (20 Mar 2019 05:00) (78 - 92)  RR: 25 (20 Mar 2019 07:00) (17 - 30)  SpO2: 94% (20 Mar 2019 07:00) (93% - 97%)    POCT Blood Glucose.: 219 mg/dL (19 Mar 2019 20:53)  POCT Blood Glucose.: 175 mg/dL (19 Mar 2019 18:13)  POCT Blood Glucose.: 213 mg/dL (19 Mar 2019 11:21)  POCT Blood Glucose.: 227 mg/dL (19 Mar 2019 08:11)    CAPILLARY BLOOD GLUCOSE    POCT Blood Glucose.: 219 mg/dL (19 Mar 2019 20:53)    I&O's Summary    19 Mar 2019 07:01  -  20 Mar 2019 07:00  --------------------------------------------------------  IN: 280 mL / OUT: 1325 mL / NET: -1045 mL    Daily Height in cm: 167.64 (19 Mar 2019 14:01)    Daily Weight in k (20 Mar 2019 06:00)    PHYSICAL EXAMINATION:  General: WN/WD NAD  HEENT: PERRLA, EOMI, moist mucous membranes  Neurology: A&Ox3, nonfocal, VERGARA x 4  Respiratory: CTA B/L, normal respiratory effort, no wheezes, crackles, rales  CV: RRR, S1S2, no murmurs, rubs or gallops  Abdominal: Soft, NT, ND +BS, Last BM  Extremities: No edema, + peripheral pulses  Skin: left chest wall with wound vac, right groin access site c/d/i    LABS:                          11.9   6.4   )-----------( 192      ( 20 Mar 2019 05:33 )             35.4     03-20    138  |  101  |  20  ----------------------------<  195<H>  4.1   |  19<L>  |  1.51<H>    Ca    8.9      20 Mar 2019 05:33  Phos  3.3     03-20  Mg     2.0     03-20    TPro  6.8  /  Alb  3.7  /  TBili  0.4  /  DBili  x   /  AST  19  /  ALT  19  /  AlkPhos  55  03-20    LIVER FUNCTIONS - ( 20 Mar 2019 05:33 )  Alb: 3.7 g/dL / Pro: 6.8 g/dL / ALK PHOS: 55 U/L / ALT: 19 U/L / AST: 19 U/L / GGT: x           PT/INR - ( 20 Mar 2019 05:33 )   PT: 13.2 sec;   INR: 1.15 ratio       PTT - ( 20 Mar 2019 05:33 )  PTT:29.6 sec    CARDIAC MARKERS ( 18 Mar 2019 12:28 )  x     / x     / 63 U/L / x     / x        TELEMETRY: SR paced 70-80    EKG:     IMAGING:

## 2019-03-20 NOTE — PROGRESS NOTE ADULT - PROBLEM SELECTOR PLAN 3
- FRANCIS from vanc at OSH, current stable ~1.5  - trend SCr, renally dose medications, avoid nephrotoxic agents

## 2019-03-20 NOTE — PROGRESS NOTE ADULT - ASSESSMENT
68 year old male with Sick Sinus Syndrome requiring a pacemaker, had a pacemaker battery exchage about a year ago and noticed that the PPM was migrating through the skin. He was transferred from [facility] for PPM infection where the PPM was extracted, but the lead wires remained and attached to PPM near [facility]. During his hospitalization, he developed worsening renal function, and was changed from vancomycin to daptomycin. All blood cultures were negative, but a wound culture was positive for CoNS. He was continued on dapto and transferred to [facility] for lead extraction and reimplantation of another PPM. He currently feels well. Afebrile. WBC WNL.    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  Now s/p micra PPM placement    4) FRANCIS - monitor Cr

## 2019-03-20 NOTE — DISCHARGE NOTE NURSING/CASE MANAGEMENT/SOCIAL WORK - NSSCCARECORD_GEN_ALL_CORE
[facility]/Home Care Agency/Community [facility]

## 2019-03-20 NOTE — PROGRESS NOTE ADULT - PROBLEM SELECTOR PLAN 2
- PPM wound site with coag negative staph, BCx negative, s/p vanc and zosyn 3/7-3/12, now on Daptomycin due to FRANCIS (CK wnl, monitor weekly)  - 2 weeks of abx post lead extraction (3/19) - Daptomycin day 1/14, will consult PICC team   - ID recommendations appreciated

## 2019-03-20 NOTE — PROGRESS NOTE ADULT - PROBLEM SELECTOR PLAN 1
- SSS s/p Medtronic PPM 1995 with battery exchange last year  - attempted extraction with removal of generator at OSH, with retention of intravascular pacing leads, now s/p complete removal and micra on 3/19  - TTE 3/18 without evidence of endocarditis   - v paced 70-80 on telemetry

## 2019-03-20 NOTE — DISCHARGE NOTE PROVIDER - HOSPITAL COURSE
68M with CHB vs. SSS s/p PPM (Medtronic), PVD with Bilateral LE Stents transferred from [facility] for lead extraction 2/2 infected pacemaker. Underwent attempted system extraction with Dr. Dhaliwal. Right IJ active fixation temporary pacemaker with externalized Medtronic generator was placed. At the time of attempted extraction, the generator was removed, but the remaining pocket leads were cut and retracted to the central circulation. Remains with multiple intravascular pacing leads. Pt. underwent complete lead extraction 3/19 with micra placement. Wound vac placed at prior generator site. Generator pocket with coag negative staph, was on vancomycin and zosyn, now on daptomycin due to FRANCIS, ID consulted plan for two weeks of threatment through 4/2. Pt. is stable and safe for discharge with follow up with cardiology.

## 2019-03-20 NOTE — PROGRESS NOTE ADULT - ASSESSMENT
68M with CHB vs. SSS s/p PPM (Medtronic), PVD with Bilateral LE Stents transferred from [facility] [place] for lead extraction 2/2 infected pacemaker. Underwent attempted system extraction with Dr. Dhaliwal, the generator was removed, but the remaining pocket leads were cut and retracted to the central circulation, at that time RIJ active fixation temporary pacemaker with externalized Medtronic generator was placed. Now s/p complete lead removal and micra placement 3/19. Generator pocket with coag negative staph, was on vancomycin and zosyn, now on daptomycin due to FRANCIS.

## 2019-03-20 NOTE — DIETITIAN INITIAL EVALUATION ADULT. - OTHER INFO
Pt seen for initial nutrition assessment for ICU length of stay on CCU.  Pt denies nausea/vomiting - reports diarrhea prior to transfer to [facility] due to "pills" but no c/o diarrhea/constipation this admission.  Pt reports frustration with elevated BG during hospitalization and having to take insulin.  Per H&P Pt taking glipizide, janumet and jardiance PTA.  Pt excited to be discharged today so that he can go back to oral DM control; Pt also notes frequent physical activity, which he expects to positively impact BG control.  RD encouraged Pt to adhere to consistent carbohydrate diet and BG control for wound healing in context of surgical incision.  Also noted Pt likes oates - RD encouraged reduced intake of sodium and saturated fat.  Wife states oates intake is very occasional and that she has successfully encouraged Pt to eat more broccoli.  Pt and wife amenable to handouts - T2DM Nutrition Therapy and Heart Healthy Low Na Nutrition Therapy.  Pt notes that he is followed by outpatient endocrinologist at least [id number].  Recommend d/c renal diet restriction in context of WDL electrolytes.

## 2019-03-20 NOTE — PROGRESS NOTE ADULT - REASON FOR ADMISSION
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

## 2019-03-21 PROBLEM — Z00.00 ENCOUNTER FOR PREVENTIVE HEALTH EXAMINATION: Status: ACTIVE | Noted: 2019-03-21

## 2019-03-22 LAB
CULTURE RESULTS: SIGNIFICANT CHANGE UP
CULTURE RESULTS: SIGNIFICANT CHANGE UP
SPECIMEN SOURCE: SIGNIFICANT CHANGE UP
SPECIMEN SOURCE: SIGNIFICANT CHANGE UP

## 2019-03-29 DIAGNOSIS — T82.110A BREAKDOWN (MECHANICAL) OF CARDIAC ELECTRODE, INITIAL ENCOUNTER: ICD-10-CM

## 2019-03-29 DIAGNOSIS — T82.7XXA INFECTION AND INFLAMMATORY REACTION DUE TO OTHER CARDIAC AND VASCULAR DEVICES, IMPLANTS AND GRAFTS, INITIAL ENCOUNTER: ICD-10-CM

## 2019-03-29 DIAGNOSIS — Z86.39 PERSONAL HISTORY OF OTHER ENDOCRINE, NUTRITIONAL AND METABOLIC DISEASE: ICD-10-CM

## 2019-03-29 DIAGNOSIS — Z95.0 PRESENCE OF CARDIAC PACEMAKER: ICD-10-CM

## 2019-03-29 DIAGNOSIS — Z86.79 PERSONAL HISTORY OF OTHER DISEASES OF THE CIRCULATORY SYSTEM: ICD-10-CM

## 2019-03-29 DIAGNOSIS — I44.2 ATRIOVENTRICULAR BLOCK, COMPLETE: ICD-10-CM

## 2019-03-29 RX ORDER — HYDRALAZINE HYDROCHLORIDE 10 MG/1
10 TABLET ORAL
Refills: 0 | Status: ACTIVE | COMMUNITY

## 2019-03-29 RX ORDER — METOPROLOL SUCCINATE 50 MG/1
50 TABLET, EXTENDED RELEASE ORAL
Refills: 0 | Status: ACTIVE | COMMUNITY

## 2019-03-29 RX ORDER — CLOPIDOGREL 75 MG/1
75 TABLET, FILM COATED ORAL
Refills: 0 | Status: ACTIVE | COMMUNITY

## 2019-03-29 RX ORDER — AMLODIPINE BESYLATE 10 MG/1
10 TABLET ORAL
Refills: 0 | Status: ACTIVE | COMMUNITY

## 2019-03-29 RX ORDER — DOCUSATE SODIUM 100 MG/1
CAPSULE ORAL
Refills: 0 | Status: ACTIVE | COMMUNITY

## 2019-03-29 RX ORDER — DAPTOMYCIN 500 MG/20ML
500 INJECTION, POWDER, LYOPHILIZED, FOR SUSPENSION INTRAVENOUS
Refills: 0 | Status: ACTIVE | COMMUNITY

## 2019-03-29 RX ORDER — ASPIRIN ENTERIC COATED TABLETS 81 MG 81 MG/1
81 TABLET, DELAYED RELEASE ORAL
Refills: 0 | Status: ACTIVE | COMMUNITY

## 2019-04-24 NOTE — DISCHARGE NOTE PROVIDER - NSDCHHHOMEBOUND_GEN_ALL_CORE
No recent flareups.  Full range of motion.  Continue routine back care and back exercises.       Other, specify...

## 2019-09-03 NOTE — CHART NOTE - NSCHARTNOTESELECT_GEN_ALL_CORE
Brief Op Note/Event Note
CCU mid rounds/Event Note
Event Note/CCU mid rounds
Event Note/mn rounds
Transfer Note
no
